# Patient Record
Sex: FEMALE | Race: WHITE | NOT HISPANIC OR LATINO | Employment: OTHER | URBAN - METROPOLITAN AREA
[De-identification: names, ages, dates, MRNs, and addresses within clinical notes are randomized per-mention and may not be internally consistent; named-entity substitution may affect disease eponyms.]

---

## 2018-02-13 ENCOUNTER — OFFICE VISIT CONVERTED (OUTPATIENT)
Dept: CARDIOLOGY | Facility: CLINIC | Age: 63
End: 2018-02-13
Attending: SPECIALIST

## 2018-07-27 ENCOUNTER — CONVERSION ENCOUNTER (OUTPATIENT)
Dept: MAMMOGRAPHY | Facility: HOSPITAL | Age: 63
End: 2018-07-27

## 2018-09-18 ENCOUNTER — OFFICE VISIT CONVERTED (OUTPATIENT)
Dept: CARDIOLOGY | Facility: CLINIC | Age: 63
End: 2018-09-18
Attending: SPECIALIST

## 2019-03-07 ENCOUNTER — HOSPITAL ENCOUNTER (OUTPATIENT)
Dept: OTHER | Facility: HOSPITAL | Age: 64
Discharge: HOME | End: 2019-03-07
Attending: INTERNAL MEDICINE

## 2019-03-07 LAB
ALBUMIN SERPL-MCNC: 4.5 G/DL (ref 3.5–5)
ALBUMIN/GLOB SERPL: 1.4 {RATIO} (ref 1.4–2.6)
ALP SERPL-CCNC: 71 U/L (ref 43–160)
ALT SERPL-CCNC: 56 U/L (ref 10–40)
ANION GAP SERPL CALC-SCNC: 20 MMOL/L (ref 8–19)
AST SERPL-CCNC: 37 U/L (ref 15–50)
BILIRUB SERPL-MCNC: 0.58 MG/DL (ref 0.2–1.3)
BUN SERPL-MCNC: 16 MG/DL (ref 5–25)
BUN/CREAT SERPL: 22 {RATIO} (ref 6–20)
CALCIUM SERPL-MCNC: 9.6 MG/DL (ref 8.7–10.4)
CHLORIDE SERPL-SCNC: 99 MMOL/L (ref 99–111)
CHOLEST SERPL-MCNC: 165 MG/DL (ref 107–200)
CHOLEST/HDLC SERPL: 2.8 {RATIO} (ref 3–6)
CONV CO2: 25 MMOL/L (ref 22–32)
CONV TOTAL PROTEIN: 7.7 G/DL (ref 6.3–8.2)
CREAT UR-MCNC: 0.73 MG/DL (ref 0.5–0.9)
EST. AVERAGE GLUCOSE BLD GHB EST-MCNC: 160 MG/DL
GFR SERPLBLD BASED ON 1.73 SQ M-ARVRAT: >60 ML/MIN/{1.73_M2}
GLOBULIN UR ELPH-MCNC: 3.2 G/DL (ref 2–3.5)
GLUCOSE SERPL-MCNC: 196 MG/DL (ref 65–99)
HBA1C MFR BLD: 7.2 % (ref 3.5–5.7)
HDLC SERPL-MCNC: 59 MG/DL (ref 40–60)
LDLC SERPL CALC-MCNC: 53 MG/DL (ref 70–100)
OSMOLALITY SERPL CALC.SUM OF ELEC: 297 MOSM/KG (ref 273–304)
POTASSIUM SERPL-SCNC: 4.2 MMOL/L (ref 3.5–5.3)
SODIUM SERPL-SCNC: 140 MMOL/L (ref 135–147)
TRIGL SERPL-MCNC: 263 MG/DL (ref 40–150)
VLDLC SERPL-MCNC: 53 MG/DL (ref 5–37)

## 2019-03-18 ENCOUNTER — CONVERSION ENCOUNTER (OUTPATIENT)
Dept: CARDIOLOGY | Facility: CLINIC | Age: 64
End: 2019-03-18

## 2019-03-18 ENCOUNTER — CONVERSION ENCOUNTER (OUTPATIENT)
Dept: CARDIOLOGY | Facility: CLINIC | Age: 64
End: 2019-03-18
Attending: SPECIALIST

## 2019-06-03 ENCOUNTER — HOSPITAL ENCOUNTER (OUTPATIENT)
Dept: OTHER | Facility: HOSPITAL | Age: 64
Discharge: HOME | End: 2019-06-03

## 2019-06-03 LAB
ALBUMIN SERPL-MCNC: 4.4 G/DL (ref 3.5–5)
ALBUMIN/GLOB SERPL: 1.5 {RATIO} (ref 1.4–2.6)
ALP SERPL-CCNC: 63 U/L (ref 43–160)
ALT SERPL-CCNC: 44 U/L (ref 10–40)
ANION GAP SERPL CALC-SCNC: 18 MMOL/L (ref 8–19)
APPEARANCE UR: CLEAR
AST SERPL-CCNC: 34 U/L (ref 15–50)
BASOPHILS # BLD AUTO: 0.06 10*3/UL (ref 0–0.2)
BASOPHILS NFR BLD AUTO: 0.8 % (ref 0–3)
BILIRUB SERPL-MCNC: 0.75 MG/DL (ref 0.2–1.3)
BILIRUB UR QL: NEGATIVE
BUN SERPL-MCNC: 15 MG/DL (ref 5–25)
BUN/CREAT SERPL: 17 {RATIO} (ref 6–20)
CALCIUM SERPL-MCNC: 9.5 MG/DL (ref 8.7–10.4)
CHLORIDE SERPL-SCNC: 100 MMOL/L (ref 99–111)
CHOLEST SERPL-MCNC: 151 MG/DL (ref 107–200)
CHOLEST/HDLC SERPL: 3.4 {RATIO} (ref 3–6)
COLOR UR: YELLOW
CONV ABS IMM GRAN: 0.03 10*3/UL (ref 0–0.2)
CONV BACTERIA: NEGATIVE
CONV CO2: 25 MMOL/L (ref 22–32)
CONV COLLECTION SOURCE (UA): ABNORMAL
CONV CREATININE URINE, RANDOM: 101.9 MG/DL (ref 10–300)
CONV IMMATURE GRAN: 0.4 % (ref 0–1.8)
CONV MICROALBUM.,U,RANDOM: <12 MG/L (ref 0–20)
CONV TOTAL PROTEIN: 7.4 G/DL (ref 6.3–8.2)
CONV UROBILINOGEN IN URINE BY AUTOMATED TEST STRIP: 1 {EHRLICHU}/DL (ref 0.1–1)
CREAT UR-MCNC: 0.88 MG/DL (ref 0.5–0.9)
DEPRECATED RDW RBC AUTO: 41.2 FL (ref 36.4–46.3)
EOSINOPHIL # BLD AUTO: 0.36 10*3/UL (ref 0–0.7)
EOSINOPHIL # BLD AUTO: 4.6 % (ref 0–7)
ERYTHROCYTE [DISTWIDTH] IN BLOOD BY AUTOMATED COUNT: 12.5 % (ref 11.7–14.4)
EST. AVERAGE GLUCOSE BLD GHB EST-MCNC: 143 MG/DL
GFR SERPLBLD BASED ON 1.73 SQ M-ARVRAT: >60 ML/MIN/{1.73_M2}
GLOBULIN UR ELPH-MCNC: 3 G/DL (ref 2–3.5)
GLUCOSE SERPL-MCNC: 164 MG/DL (ref 65–99)
GLUCOSE UR QL: NEGATIVE MG/DL
HBA1C MFR BLD: 13.5 G/DL (ref 12–16)
HBA1C MFR BLD: 6.6 % (ref 3.5–5.7)
HCT VFR BLD AUTO: 40.6 % (ref 37–47)
HDLC SERPL-MCNC: 44 MG/DL (ref 40–60)
HGB UR QL STRIP: NEGATIVE
KETONES UR QL STRIP: NEGATIVE MG/DL
LDLC SERPL CALC-MCNC: 66 MG/DL (ref 70–100)
LEUKOCYTE ESTERASE UR QL STRIP: ABNORMAL
LYMPHOCYTES # BLD AUTO: 1.99 10*3/UL (ref 1–5)
MCH RBC QN AUTO: 30.1 PG (ref 27–31)
MCHC RBC AUTO-ENTMCNC: 33.3 G/DL (ref 33–37)
MCV RBC AUTO: 90.4 FL (ref 81–99)
MICROALBUMIN/CREAT UR: 11.8 MG/G{CRE} (ref 0–35)
MONOCYTES # BLD AUTO: 0.44 10*3/UL (ref 0.2–1.2)
MONOCYTES NFR BLD AUTO: 5.7 % (ref 3–10)
NEUTROPHILS # BLD AUTO: 4.87 10*3/UL (ref 2–8)
NEUTROPHILS NFR BLD AUTO: 62.8 % (ref 30–85)
NITRITE UR QL STRIP: NEGATIVE
NRBC CBCN: 0 % (ref 0–0.7)
OSMOLALITY SERPL CALC.SUM OF ELEC: 292 MOSM/KG (ref 273–304)
PH UR STRIP.AUTO: 6 [PH] (ref 5–8)
PLATELET # BLD AUTO: 252 10*3/UL (ref 130–400)
PMV BLD AUTO: 10.3 FL (ref 9.4–12.3)
POTASSIUM SERPL-SCNC: 4.2 MMOL/L (ref 3.5–5.3)
PROT UR QL: NEGATIVE MG/DL
RBC # BLD AUTO: 4.49 10*6/UL (ref 4.2–5.4)
RBC #/AREA URNS HPF: ABNORMAL /[HPF]
SODIUM SERPL-SCNC: 139 MMOL/L (ref 135–147)
SP GR UR: 1.01 (ref 1–1.03)
T4 FREE SERPL-MCNC: 1.2 NG/DL (ref 0.9–1.8)
TRIGL SERPL-MCNC: 207 MG/DL (ref 40–150)
TSH SERPL-ACNC: 4.16 M[IU]/L (ref 0.27–4.2)
VARIANT LYMPHS NFR BLD MANUAL: 25.7 % (ref 20–45)
VLDLC SERPL-MCNC: 41 MG/DL (ref 5–37)
WBC # BLD AUTO: 7.75 10*3/UL (ref 4.8–10.8)
WBC #/AREA URNS HPF: ABNORMAL /[HPF]

## 2019-08-28 ENCOUNTER — HOSPITAL ENCOUNTER (OUTPATIENT)
Dept: OTHER | Facility: HOSPITAL | Age: 64
Discharge: HOME | End: 2019-08-28

## 2019-08-28 LAB
25(OH)D3 SERPL-MCNC: 24.3 NG/ML (ref 30–100)
ALBUMIN SERPL-MCNC: 4.6 G/DL (ref 3.5–5)
ALBUMIN/GLOB SERPL: 1.5 {RATIO} (ref 1.4–2.6)
ALP SERPL-CCNC: 61 U/L (ref 43–160)
ALT SERPL-CCNC: 48 U/L (ref 10–40)
ANION GAP SERPL CALC-SCNC: 19 MMOL/L (ref 8–19)
APPEARANCE UR: CLEAR
AST SERPL-CCNC: 35 U/L (ref 15–50)
BASOPHILS # BLD AUTO: 0.09 10*3/UL (ref 0–0.2)
BASOPHILS NFR BLD AUTO: 1.2 % (ref 0–3)
BILIRUB SERPL-MCNC: 0.68 MG/DL (ref 0.2–1.3)
BILIRUB UR QL: NEGATIVE
BUN SERPL-MCNC: 16 MG/DL (ref 5–25)
BUN/CREAT SERPL: 19 {RATIO} (ref 6–20)
CALCIUM SERPL-MCNC: 9.6 MG/DL (ref 8.7–10.4)
CHLORIDE SERPL-SCNC: 101 MMOL/L (ref 99–111)
CHOLEST SERPL-MCNC: 163 MG/DL (ref 107–200)
CHOLEST/HDLC SERPL: 3.1 {RATIO} (ref 3–6)
COLOR UR: YELLOW
CONV ABS IMM GRAN: 0.02 10*3/UL (ref 0–0.2)
CONV BACTERIA: NEGATIVE
CONV CO2: 24 MMOL/L (ref 22–32)
CONV COLLECTION SOURCE (UA): ABNORMAL
CONV IMMATURE GRAN: 0.3 % (ref 0–1.8)
CONV TOTAL PROTEIN: 7.6 G/DL (ref 6.3–8.2)
CONV UROBILINOGEN IN URINE BY AUTOMATED TEST STRIP: 0.2 {EHRLICHU}/DL (ref 0.1–1)
CREAT UR-MCNC: 0.85 MG/DL (ref 0.5–0.9)
DEPRECATED RDW RBC AUTO: 41.5 FL (ref 36.4–46.3)
EOSINOPHIL # BLD AUTO: 0.32 10*3/UL (ref 0–0.7)
EOSINOPHIL # BLD AUTO: 4.3 % (ref 0–7)
ERYTHROCYTE [DISTWIDTH] IN BLOOD BY AUTOMATED COUNT: 12.7 % (ref 11.7–14.4)
EST. AVERAGE GLUCOSE BLD GHB EST-MCNC: 148 MG/DL
GFR SERPLBLD BASED ON 1.73 SQ M-ARVRAT: >60 ML/MIN/{1.73_M2}
GLOBULIN UR ELPH-MCNC: 3 G/DL (ref 2–3.5)
GLUCOSE SERPL-MCNC: 156 MG/DL (ref 65–99)
GLUCOSE UR QL: NEGATIVE MG/DL
HBA1C MFR BLD: 6.8 % (ref 3.5–5.7)
HCT VFR BLD AUTO: 40.8 % (ref 37–47)
HDLC SERPL-MCNC: 53 MG/DL (ref 40–60)
HGB BLD-MCNC: 13.8 G/DL (ref 12–16)
HGB UR QL STRIP: NEGATIVE
KETONES UR QL STRIP: NEGATIVE MG/DL
LDLC SERPL CALC-MCNC: 64 MG/DL (ref 70–100)
LEUKOCYTE ESTERASE UR QL STRIP: ABNORMAL
LYMPHOCYTES # BLD AUTO: 2.12 10*3/UL (ref 1–5)
LYMPHOCYTES NFR BLD AUTO: 28.2 % (ref 20–45)
MCH RBC QN AUTO: 30.2 PG (ref 27–31)
MCHC RBC AUTO-ENTMCNC: 33.8 G/DL (ref 33–37)
MCV RBC AUTO: 89.3 FL (ref 81–99)
MONOCYTES # BLD AUTO: 0.41 10*3/UL (ref 0.2–1.2)
MONOCYTES NFR BLD AUTO: 5.5 % (ref 3–10)
NEUTROPHILS # BLD AUTO: 4.55 10*3/UL (ref 2–8)
NEUTROPHILS NFR BLD AUTO: 60.5 % (ref 30–85)
NITRITE UR QL STRIP: NEGATIVE
NRBC CBCN: 0 % (ref 0–0.7)
OSMOLALITY SERPL CALC.SUM OF ELEC: 294 MOSM/KG (ref 273–304)
PH UR STRIP.AUTO: 6.5 [PH] (ref 5–8)
PLATELET # BLD AUTO: 245 10*3/UL (ref 130–400)
PMV BLD AUTO: 9.9 FL (ref 9.4–12.3)
POTASSIUM SERPL-SCNC: 4.4 MMOL/L (ref 3.5–5.3)
PROT UR QL: NEGATIVE MG/DL
RBC # BLD AUTO: 4.57 10*6/UL (ref 4.2–5.4)
RBC #/AREA URNS HPF: ABNORMAL /[HPF]
SODIUM SERPL-SCNC: 140 MMOL/L (ref 135–147)
SP GR UR: 1.01 (ref 1–1.03)
SQUAMOUS SPT QL MICRO: ABNORMAL /[HPF]
TRIGL SERPL-MCNC: 229 MG/DL (ref 40–150)
VLDLC SERPL-MCNC: 46 MG/DL (ref 5–37)
WBC # BLD AUTO: 7.51 10*3/UL (ref 4.8–10.8)
WBC #/AREA URNS HPF: ABNORMAL /[HPF]

## 2019-08-30 LAB — BACTERIA UR CULT: NORMAL

## 2019-09-24 ENCOUNTER — CONVERSION ENCOUNTER (OUTPATIENT)
Dept: CARDIOLOGY | Facility: CLINIC | Age: 64
End: 2019-09-24
Attending: SPECIALIST

## 2020-06-23 ENCOUNTER — CONVERSION ENCOUNTER (OUTPATIENT)
Dept: CARDIOLOGY | Facility: CLINIC | Age: 65
End: 2020-06-23

## 2020-06-23 ENCOUNTER — OFFICE VISIT CONVERTED (OUTPATIENT)
Dept: CARDIOLOGY | Facility: CLINIC | Age: 65
End: 2020-06-23
Attending: SPECIALIST

## 2021-01-15 ENCOUNTER — CONVERSION ENCOUNTER (OUTPATIENT)
Dept: CARDIOLOGY | Facility: CLINIC | Age: 66
End: 2021-01-15
Attending: SPECIALIST

## 2021-01-15 ENCOUNTER — CONVERSION ENCOUNTER (OUTPATIENT)
Dept: CARDIOLOGY | Facility: CLINIC | Age: 66
End: 2021-01-15

## 2021-05-10 NOTE — PROCEDURES
"   Procedure Note      Patient Name: Geeta Blair   Patient ID: [id number]   Sex: Female   YOB: 1955    Primary Care Provider: Shannan HEREDIA   Referring Provider: Shannan HEREDIA    Visit Date: January 15, 2021    Provider: Wil Montano MD   Location: [facility]   Location Address: [address] [place] [address], [address]   [place]  [id number]   Location Phone: [phone]          FINAL REPORT   TRANSTHORACIC ECHOCARDIOGRAM REPORT    Diagnosis: Cardiomyopathy   Height: 5'5\" Weight: 183 B/P: 162/102 BSA: 1.9   Tech: [email or web address]   MEASUREMENTS:  RVID (Diastole) : RVID. (NORMAL: 0.7 to 2.4 cm max)   LVID (Systole): 4.0 cm (Diastole): 5.8 cm . (NORMAL: 3.7 - 5.4 cm)   Posterior Wall Thickness (Diastole): 0.9 cm. (NORMAL: 0.8 - 1.1 cm)   Septal Thickness (Diastole): 0.9 cm. (NORMAL: 0.7 - 1.2 cm)   LAID (Systole): 3.6 cm. (NORMAL: 1.9 - 3.8 cm)   Aortic Root Diameter (Diastole): 2.9 cm. (NORMAL: 2.0 - 3.7 cm)   DOPPLER:  E/A ratio 0.5 (NORMAL 0.8-2.0)   DT: 194 msec (NORMAL 140-240 msec.)   IVRT 92 m/sec (NORMAL  m/sec.)   E/E': 11 (NORMAL <8 avg.)   COMMENTS:  The patient underwent 2-D, M-Mode, and Doppler examination, including pulse-wave, continuous-wave, and color-flow analysis; the study is technically adequate.   FINDINGS:  AORTIC VALVE: Normal. Tricuspid in appearance with normal central closure.   MITRAL VALVE: Normal. Bicuspid in appearance.   TRICUSPID VALVE: Normal.   PULMONIC VALVE: Not well visualized.   LEFT ATRIUM: Normal. No intracavitary masses or clots seen. LA volume index is 28 mL/m2.   AORTIC ROOT: Normal in size with adequate motion.   LEFT VENTRICLE: Normal left ventricular systolic function. Ejection fraction 45-50%. Mild diffuse hypokinesis.   RIGHT ATRIUM: Normal.   RIGHT VENTRICLE: Normal size and function.   PERICARDIUM: Unremarkable. No evidence of effusion.   INFERIOR VENA CAVA: Diameter is 1.3 cm.   DOPPLER: Doppler examination of the aortic, mitral, " tricuspid, and pulmonary valves was performed. Normal pulmonary artery systolic pressure by Doppler. There was mild mitral regurgitation.   Faxed: 01/18/2021      CONCLUSION:  1.  Mildly reduced left ventricular systolic function.   2.  Mild mitral regurgitation.       Wil Montano MD  ONOFRE/pap                 Electronically Signed by: Mariely Mckeon-, Other -Author on January 18, 2021 01:42:22 PM  Electronically Co-signed by: Wil Montano MD -Reviewer on January 20, 2021 10:46:57 AM  Electronically Co-signed by: Alyssa Motta-, Other -Reviewer on February 3, 2021 06:23:51 PM

## 2021-05-13 NOTE — PROGRESS NOTES
"   Progress Note      Patient Name: Geeta Blair   Patient ID: [id number]   Sex: Female   YOB: 1955    Primary Care Provider: Shannan HEREDIA   Referring Provider: Shannan HEREDIA    Visit Date: June 23, 2020    Provider: Wil Montano MD   Location: [facility]   Location Address: [address] [place] [address], [address]   [place]  [id number]   Location Phone: [phone]          Chief Complaint  · Hypertension.  · Cardiomyopathy.      History Of Present Illness  Geeta Blair is a 64 year old /White female with a history of cardiomyopathy and hypertension. Denies any chest pain or shortness of breath. No ICD shocks.   CURRENT MEDICATIONS: Lisinopril 5 mg daily; Klor-Con M20 20 mEq daily; metformin  mg b.i.d.; atorvastatin 20 mg q. p.m.; furosemide 40 mg daily; carvedilol 6.25 mg 1.5 tablets b.i.d.; glimepiride 2 mg daily; aspirin 81 mg q. p.m.; Omega-3 2 capsules b.i.d.; NitroPatch 0.4 mg/hour q. a.m.   PAST MEDICAL HISTORY: Cardiomyopathy; Congestive heart failure; Diabetes mellitus; Hyperlipidemia; Hypertension; Presence of ICD.   FAMILY HISTORY: Positive for diabetes mellitus, hypertension, and heart disease.   PSYCHOSOCIAL HISTORY: Walks her dog 4-5x daily. Denies tobacco use. Rarely consumes alcohol. Denies mood changes or depression.       Review of Systems  · Cardiovascular  o Denies  o : palpitations (fast, fluttering, or skipping beats), swelling (feet, ankles, hands), shortness of breath while walking or lying flat, chest pain or angina pectoris   · Respiratory  o Denies  o : chronic or frequent cough, asthma or wheezing      Vitals  Date Time BP Position Site L\R Cuff Size HR RR TEMP (F) WT  HT  BMI kg/m2 BSA m2 O2 Sat HC       06/23/2020 11:00 /102 Sitting    88 - R   183lbs 0oz 5'  5\" 30.45 1.95     06/23/2020 11:00 /88 Sitting                     Physical Examination  · Constitutional  o Appearance  o : Awake, alert, " cooperative, pleasant.  · Respiratory  o Inspection of Chest  o : No chest wall deformities, moving equal.  o Auscultation of Lungs  o : Good air entry with vesicular breath sounds.  · Cardiovascular  o Heart  o :   § Auscultation of Heart  § : S1 and S2 regular. No S3. No S4. No murmurs.  o Peripheral Vascular System  o :   § Extremities  § : Peripheral pulses were well felt. No edema. No cyanosis.  · Gastrointestinal  o Abdominal Examination  o : No masses or organomegaly noted.          Assessment     ASSESSMENT & PLAN:    1.  Nonischemic cardiomyopathy with presence of ICD, stable.  ICD was checked per the  and        is functioning normally.  Continue current dose of carvedilol.  Continue current dose of lisinopril.  2.  Essential hypertension.   Monitor blood pressure regularly.  Continue current dose of lisinopril and        carvedilol.    3.  Chronic systolic heart failure, class II, stable.  Continue current dose of Lasix.  4.  See me back in 6 months.             Electronically Signed by: Alyssa Motta-, Other -Author on June 25, 2020 11:33:06 AM  Electronically Co-signed by: Wil Montano MD -Reviewer on June 30, 2020 08:30:50 AM

## 2021-05-14 VITALS
HEART RATE: 82 BPM | WEIGHT: 183 LBS | DIASTOLIC BLOOD PRESSURE: 86 MMHG | BODY MASS INDEX: 30.49 KG/M2 | SYSTOLIC BLOOD PRESSURE: 162 MMHG | HEIGHT: 65 IN

## 2021-05-14 NOTE — PROGRESS NOTES
"   Progress Note      Patient Name: Geeta Blair   Patient ID: [id number]   Sex: Female   YOB: 1955    Primary Care Provider: Shannan HEREDIA   Referring Provider: Shannan HEREDIA    Visit Date: January 15, 2021    Provider: Wil Montano MD   Location: [facility]   Location Address: [address] [place] [address], [address]   [place], [place]  [id number]   Location Phone: [phone]          Chief Complaint  · Cardiomyopathy.  · Hypertension.  · Presence of ICD.       History Of Present Illness  Geeta Blair is a 65 year old /White female with a history of cardiomyopathy and hypertension. No chest pain. No ICD shocks.   CURRENT MEDICATIONS: Lisinopril 20 mg daily; metformin 500 mg b.i.d.; carvedilol 6.25 mg 1.5 tablets b.i.d.; glimepiride 2 mg b.i.d.; atorvastatin 20 mg q. h.s.; aspirin 81 mg daily; furosemide 40 mg daily; Klor-Con M20 20 mEq daily; Omega-3 2 capsules b.i.d.; NitroPatch 0.4 mg/hour daily; multivitamin daily.   PAST MEDICAL HISTORY: Cardiomyopathy; Congestive heart failure; Diabetes mellitus; Hyperlipidemia; Hypertension; Presence of ICD.   PSYCHOSOCIAL HISTORY: Denies alcohol or tobacco use.      ALLERGIES:  PENICILLIN.       Review of Systems  · Cardiovascular  o Denies  o : palpitations (fast, fluttering, or skipping beats), swelling (feet, ankles, hands), shortness of breath while walking or lying flat, chest pain or angina pectoris   · Respiratory  o Denies  o : chronic or frequent cough      Vitals  Date Time BP Position Site L\R Cuff Size HR RR TEMP (F) WT  HT  BMI kg/m2 BSA m2 O2 Sat FR L/min FiO2 HC       01/15/2021 06:18 /86 Sitting    82 - R   183lbs 0oz 5'  5\" 30.45 1.95       01/15/2021 06:18 /86 Sitting    78 - R                   Physical Examination  · Constitutional  o Appearance  o : Awake, alert, cooperative, pleasant.  · Respiratory  o Inspection of Chest  o : No chest wall deformities, moving equal.  o Auscultation of " Lungs  o : Good air entry with vesicular breath sounds.  · Cardiovascular  o Heart  o :   § Auscultation of Heart  § : S1 and S2 regular. No S3. No S4.   o Peripheral Vascular System  o :   § Extremities  § : Peripheral pulses were well felt. No edema. No cyanosis.  · Gastrointestinal  o Abdominal Examination  o : No masses or organomegaly noted.          Assessment     ASSESSMENT & PLAN:     1.  Nonischemic cardiomyopathy with presence of ICD, stable.  ICD checked per the , functioning        normally.  Echocardiogram done shows an ejection fraction of 49%.  Discussed this with the patient.  2.  Essential hypertension.  Continue guideline-directed medical therapy with carvedilol and lisinopril.    3.  Chronic systolic heart failure, class II, stable.  Continue Lasix.  4.  See me back in 6 months.             Electronically Signed by: Alyssa Motta-, Other -Author on February 3, 2021 06:26:51 PM  Electronically Co-signed by: Wil Montano MD -Reviewer on February 4, 2021 09:40:59 AM

## 2021-05-15 VITALS
HEIGHT: 65 IN | WEIGHT: 186 LBS | BODY MASS INDEX: 30.99 KG/M2 | HEART RATE: 90 BPM | DIASTOLIC BLOOD PRESSURE: 90 MMHG | SYSTOLIC BLOOD PRESSURE: 150 MMHG

## 2021-05-15 VITALS
WEIGHT: 181 LBS | HEART RATE: 94 BPM | SYSTOLIC BLOOD PRESSURE: 153 MMHG | BODY MASS INDEX: 30.16 KG/M2 | DIASTOLIC BLOOD PRESSURE: 85 MMHG | HEIGHT: 65 IN

## 2021-05-15 VITALS
BODY MASS INDEX: 30.49 KG/M2 | HEART RATE: 88 BPM | SYSTOLIC BLOOD PRESSURE: 162 MMHG | DIASTOLIC BLOOD PRESSURE: 102 MMHG | WEIGHT: 183 LBS | HEIGHT: 65 IN

## 2021-05-16 VITALS
HEIGHT: 65 IN | WEIGHT: 182 LBS | BODY MASS INDEX: 30.32 KG/M2 | SYSTOLIC BLOOD PRESSURE: 138 MMHG | DIASTOLIC BLOOD PRESSURE: 80 MMHG | HEART RATE: 78 BPM

## 2021-05-16 VITALS
BODY MASS INDEX: 30.66 KG/M2 | HEIGHT: 65 IN | DIASTOLIC BLOOD PRESSURE: 94 MMHG | HEART RATE: 76 BPM | WEIGHT: 184 LBS | SYSTOLIC BLOOD PRESSURE: 146 MMHG

## 2021-09-01 PROBLEM — I50.22 SYSTOLIC CHF, CHRONIC: Status: ACTIVE | Noted: 2021-09-01

## 2021-09-01 PROBLEM — I10 HYPERTENSION, ESSENTIAL: Status: ACTIVE | Noted: 2021-09-01

## 2021-09-01 PROBLEM — E78.2 HYPERLIPEMIA, MIXED: Status: ACTIVE | Noted: 2021-09-01

## 2021-09-01 PROBLEM — I42.0 NONISCHEMIC DILATED CARDIOMYOPATHY: Status: ACTIVE | Noted: 2021-09-01

## 2021-09-01 PROBLEM — Z95.810 PRESENCE OF IMPLANTABLE CARDIOVERTER-DEFIBRILLATOR (ICD): Status: ACTIVE | Noted: 2021-09-01

## 2021-09-01 NOTE — PROGRESS NOTES
[facility]  Cardiology progress Note    Patient Name: Geeta Blair  : 1955    CHIEF COMPLAINT  Hypertension.  Cardiomyopathy.        Subjective   Subjective     HISTORY OF PRESENT ILLNESS  Geeta Blair is a 66 year old /White female with a history of cardiomyopathy and hypertension. Denies any chest pain or shortness of breath. No ICD shocks.       Review of Systems:   Constitutional no fever,  no weight loss   Skin no rash   Otolaryngeal no difficulty swallowing   Cardiovascular See HPI   Pulmonary no cough, no sputum production   Gastrointestinal no constipation, no diarrhea   Genitourinary no dysuria, no hematuria   Hematologic no easy bruisability, no abnormal bleeding   Musculoskeletal no muscle pain   Neurologic no dizziness, no falls         Personal History     Social History:  reports that she has never smoked. She has never used smokeless tobacco. She reports previous alcohol use. She reports that she does not use drugs.    Home Medications:  Current Outpatient Medications on File Prior to Visit   Medication Sig   • aspirin (aspirin) 81 MG EC tablet Take 81 mg by mouth Daily.   • atorvastatin (LIPITOR) 20 MG tablet Take 20 mg by mouth Daily.   • carvedilol (COREG) 12.5 MG tablet Take 12.5 mg by mouth 2 (two) times a day.   • furosemide (LASIX) 40 MG tablet Take 40 mg by mouth Daily.   • glimepiride (AMARYL) 2 MG tablet Take 2 mg by mouth 2 (two) times a day.   • lisinopril (PRINIVIL,ZESTRIL) 20 MG tablet Take 20 mg by mouth Daily.   • metFORMIN ER (GLUCOPHAGE-XR) 500 MG 24 hr tablet    • multivitamin with minerals (MULTIVITAMIN ADULT PO) Daily.   • nitroglycerin (NITRODUR) 0.4 MG/HR patch 0.4 patches Daily.   • omega-3 acid ethyl esters (LOVAZA) 1 g capsule Take 2 g by mouth 2 (two) times a day.   • potassium chloride (KLOR-CON) 20 MEQ CR tablet Take 20 mEq by mouth Daily.     No current facility-administered medications on file prior to visit.     Allergies:  Allergies    Allergen Reactions   • Penicillins Itching       Objective    Objective       Vitals:   Heart Rate:  [81] 81  BP: (156)/(88) 156/88  Body mass index is 29.95 kg/m².     Physical Exam:   Constitutional: Awake, alert, No acute distress    Eyes: PERRLA, sclerae anicteric, no conjunctival injection   HENT: NCAT, mucous membranes moist   Neck: Supple, no thyromegaly, no lymphadenopathy, trachea midline   Respiratory: Clear to auscultation bilaterally, nonlabored respirations    Cardiovascular: RRR, no murmurs or rubs. Palpable pedal pulses bilaterally   Gastrointestinal: Positive bowel sounds, soft, nontender, nondistended   Musculoskeletal: No bilateral ankle edema, no cyanosis to extremities   Psychiatric: Appropriate affect, cooperative   Neurologic: Oriented x 3, strength symmetric in all extremities, Cranial Nerves grossly intact to confrontation, speech clear   Skin: No rashes.    Result Review    Result Review:  I have personally reviewed the available results from  [x]  Laboratory  [x]  EKG  [x]  Cardiology  [x]  Medications  [x]  Old records  []  Other:   Procedures  No results found for: CHOL  Lab Results   Component Value Date    TRIG 229 (H) 08/28/2019    TRIG 207 (H) 06/03/2019    TRIG 263 (H) 03/07/2019     Lab Results   Component Value Date    HDL 53 08/28/2019    HDL 44 06/03/2019    HDL 59 03/07/2019     Lab Results   Component Value Date    LDL 64 (L) 08/28/2019    LDL 66 (L) 06/03/2019    LDL 53 (L) 03/07/2019     Lab Results   Component Value Date    VLDL 46 (H) 08/28/2019    VLDL 41 (H) 06/03/2019    VLDL 53 (H) 03/07/2019         Impression/Plan:  1.  Nonischemic cardiomyopathy with presence of ICD, stable.  ICD was checked with  and  is functioning normally.  Continue current dose of carvedilol.  Continue current dose of lisinopril.  2.  Essential hypertension.   Monitor blood pressure regularly.  Continue current dose of lisinopril and carvedilol.  Her lisinopril and carvedilol has  been increased recently.   3.  Chronic systolic heart failure, class II, stable.  Continue current dose of Lasix.  4.  Hyperlipidemia: Continue Lipitor.  Low-fat diet advised.  Lipid profile managed by PMD.           Wil Montano MD   09/02/21   13:44 EDT

## 2021-09-02 ENCOUNTER — CLINICAL SUPPORT NO REQUIREMENTS (OUTPATIENT)
Dept: CARDIOLOGY | Facility: CLINIC | Age: 66
End: 2021-09-02

## 2021-09-02 ENCOUNTER — OFFICE VISIT (OUTPATIENT)
Dept: CARDIOLOGY | Facility: CLINIC | Age: 66
End: 2021-09-02

## 2021-09-02 VITALS
HEART RATE: 81 BPM | HEIGHT: 65 IN | SYSTOLIC BLOOD PRESSURE: 156 MMHG | BODY MASS INDEX: 29.99 KG/M2 | DIASTOLIC BLOOD PRESSURE: 88 MMHG | WEIGHT: 180 LBS

## 2021-09-02 DIAGNOSIS — Z95.810 PRESENCE OF IMPLANTABLE CARDIOVERTER-DEFIBRILLATOR (ICD): ICD-10-CM

## 2021-09-02 DIAGNOSIS — I42.8 OTHER CARDIOMYOPATHY (HCC): Primary | ICD-10-CM

## 2021-09-02 DIAGNOSIS — I42.0 NONISCHEMIC DILATED CARDIOMYOPATHY (HCC): Primary | ICD-10-CM

## 2021-09-02 DIAGNOSIS — I50.22 SYSTOLIC CHF, CHRONIC (HCC): ICD-10-CM

## 2021-09-02 DIAGNOSIS — I10 HYPERTENSION, ESSENTIAL: ICD-10-CM

## 2021-09-02 DIAGNOSIS — E78.2 HYPERLIPEMIA, MIXED: ICD-10-CM

## 2021-09-02 DIAGNOSIS — I42.0 NONISCHEMIC DILATED CARDIOMYOPATHY (HCC): ICD-10-CM

## 2021-09-02 PROCEDURE — 93282 PRGRMG EVAL IMPLANTABLE DFB: CPT | Performed by: SPECIALIST

## 2021-09-02 PROCEDURE — 99214 OFFICE O/P EST MOD 30 MIN: CPT | Performed by: SPECIALIST

## 2021-09-02 RX ORDER — LISINOPRIL 20 MG/1
20 TABLET ORAL DAILY
COMMUNITY
Start: 2021-07-06

## 2021-09-02 RX ORDER — OMEGA-3-ACID ETHYL ESTERS 1 G/1
2 CAPSULE, LIQUID FILLED ORAL 2 TIMES DAILY
COMMUNITY
Start: 2021-07-19

## 2021-09-02 RX ORDER — NITROGLYCERIN 80 MG/1
0.4 PATCH TRANSDERMAL DAILY
COMMUNITY
Start: 2021-07-04

## 2021-09-02 RX ORDER — GLIMEPIRIDE 2 MG/1
2 TABLET ORAL 2 TIMES DAILY
COMMUNITY
Start: 2021-06-10

## 2021-09-02 RX ORDER — METFORMIN HYDROCHLORIDE 500 MG/1
TABLET, EXTENDED RELEASE ORAL
COMMUNITY
Start: 2021-08-17

## 2021-09-02 RX ORDER — ASPIRIN 81 MG/1
81 TABLET ORAL DAILY
COMMUNITY

## 2021-09-02 RX ORDER — MULTIPLE VITAMINS W/ MINERALS TAB 9MG-400MCG
TAB ORAL DAILY
COMMUNITY

## 2021-09-02 RX ORDER — CARVEDILOL 12.5 MG/1
12.5 TABLET ORAL 2 TIMES DAILY
COMMUNITY
Start: 2021-07-01

## 2021-09-02 RX ORDER — POTASSIUM CHLORIDE 20 MEQ/1
20 TABLET, EXTENDED RELEASE ORAL DAILY
COMMUNITY

## 2021-09-02 RX ORDER — FUROSEMIDE 40 MG/1
40 TABLET ORAL DAILY
COMMUNITY

## 2021-09-02 RX ORDER — ATORVASTATIN CALCIUM 10 MG/1
10 TABLET, FILM COATED ORAL DAILY
COMMUNITY

## 2021-09-02 NOTE — PROGRESS NOTES
Normal VVI ICD device interrogation.  Normal evaluation of device function and lead measurements.  No optimization was needed of parameters or maximization of device longevity.  Patient is on automated Home Remote Monitoring.

## 2022-04-11 NOTE — PROGRESS NOTES
[facility]  Cardiology progress Note    Patient Name: Geeta Blair  : 1955    CHIEF COMPLAINT  Cardiomyopathy      Subjective   Subjective     HISTORY OF PRESENT ILLNESS    Geeta Blair is a 66 y.o. female with history of nonischemic cardiomyopathy and CHF.  No chest pain.  No shortness of breath.    Review of Systems:   Constitutional no fever,  no weight loss   Skin no rash   Otolaryngeal no difficulty swallowing   Cardiovascular See HPI   Pulmonary no cough, no sputum production   Gastrointestinal no constipation, no diarrhea   Genitourinary no dysuria, no hematuria   Hematologic no easy bruisability, no abnormal bleeding   Musculoskeletal no muscle pain   Neurologic no dizziness, no falls         Personal History     Social History:  reports that she has never smoked. She has never used smokeless tobacco. She reports previous alcohol use. She reports that she does not use drugs.    Home Medications:  Current Outpatient Medications on File Prior to Visit   Medication Sig   • aspirin 81 MG EC tablet Take 81 mg by mouth Daily.   • atorvastatin (LIPITOR) 10 MG tablet Take 10 mg by mouth Daily.   • carvedilol (COREG) 12.5 MG tablet Take 12.5 mg by mouth 2 (two) times a day.   • furosemide (LASIX) 40 MG tablet Take 40 mg by mouth Daily.   • glimepiride (AMARYL) 2 MG tablet Take 2 mg by mouth 2 (two) times a day.   • lisinopril (PRINIVIL,ZESTRIL) 20 MG tablet Take 20 mg by mouth Daily.   • metFORMIN ER (GLUCOPHAGE-XR) 500 MG 24 hr tablet    • multivitamin with minerals tablet tablet Daily.   • nitroglycerin (NITRODUR) 0.4 MG/HR patch 0.4 patches Daily.   • omega-3 acid ethyl esters (LOVAZA) 1 g capsule Take 2 g by mouth 2 (two) times a day.   • potassium chloride (K-DUR,KLOR-CON) 20 MEQ CR tablet Take 20 mEq by mouth Daily.     No current facility-administered medications on file prior to visit.     Allergies:  Allergies   Allergen Reactions   • Penicillins Itching       Objective    Objective        Vitals:   Heart Rate:  [94-99] 99  BP: (132-149)/(70-78) 132/70  Body mass index is 30.12 kg/m².     Physical Exam:   Constitutional: Awake, alert, No acute distress    Eyes: PERRLA, sclerae anicteric, no conjunctival injection   HENT: NCAT, mucous membranes moist   Neck: Supple, no thyromegaly, no lymphadenopathy, trachea midline   Respiratory: Clear to auscultation bilaterally, nonlabored respirations    Cardiovascular: RRR, no murmurs or rubs. Palpable pedal pulses bilaterally   Musculoskeletal: No bilateral ankle edema, no cyanosis to extremities   Psychiatric: Appropriate affect, cooperative   Neurologic: Oriented x 3, strength symmetric in all extremities, Cranial Nerves grossly intact to confrontation, speech clear   Skin: No rashes.    Result Review    Result Review:  I have personally reviewed the available results from  [x]  Laboratory  [x]  EKG  [x]  Cardiology  [x]  Medications  [x]  Old records  []  Other:   Procedures  No results found for: CHOL  Lab Results   Component Value Date    TRIG 229 (H) 08/28/2019    TRIG 207 (H) 06/03/2019    TRIG 263 (H) 03/07/2019     Lab Results   Component Value Date    HDL 53 08/28/2019    HDL 44 06/03/2019    HDL 59 03/07/2019     Lab Results   Component Value Date    LDL 64 (L) 08/28/2019    LDL 66 (L) 06/03/2019    LDL 53 (L) 03/07/2019     Lab Results   Component Value Date    VLDL 46 (H) 08/28/2019    VLDL 41 (H) 06/03/2019    VLDL 53 (H) 03/07/2019        Impression/Plan:  1.  Nonischemic cardiomyopathy: Continue lisinopril 20 mg a day.  Continue carvedilol 12.5 mg BID.  2.  Chronic systolic heart failure stable: Continue Lasix 40 mg a day.  3.  Hyperlipidemia: Continue Lipitor 20 mg once a day.  Lipid profile reviewed.  4.  Presence of ICD: ICD check shows ICD is functioning normally.           Wil Montano MD   04/12/22   13:26 EDT

## 2022-04-12 ENCOUNTER — CLINICAL SUPPORT NO REQUIREMENTS (OUTPATIENT)
Dept: CARDIOLOGY | Facility: CLINIC | Age: 67
End: 2022-04-12

## 2022-04-12 ENCOUNTER — OFFICE VISIT (OUTPATIENT)
Dept: CARDIOLOGY | Facility: CLINIC | Age: 67
End: 2022-04-12

## 2022-04-12 VITALS
HEIGHT: 65 IN | SYSTOLIC BLOOD PRESSURE: 132 MMHG | WEIGHT: 181 LBS | DIASTOLIC BLOOD PRESSURE: 70 MMHG | HEART RATE: 99 BPM | BODY MASS INDEX: 30.16 KG/M2

## 2022-04-12 DIAGNOSIS — I42.0 NONISCHEMIC DILATED CARDIOMYOPATHY: Primary | ICD-10-CM

## 2022-04-12 DIAGNOSIS — Z95.810 PRESENCE OF IMPLANTABLE CARDIOVERTER-DEFIBRILLATOR (ICD): ICD-10-CM

## 2022-04-12 DIAGNOSIS — I50.22 SYSTOLIC CHF, CHRONIC: ICD-10-CM

## 2022-04-12 DIAGNOSIS — E78.2 HYPERLIPEMIA, MIXED: ICD-10-CM

## 2022-04-12 PROCEDURE — 93282 PRGRMG EVAL IMPLANTABLE DFB: CPT | Performed by: SPECIALIST

## 2022-04-12 PROCEDURE — 99214 OFFICE O/P EST MOD 30 MIN: CPT | Performed by: SPECIALIST

## 2022-04-12 NOTE — PROGRESS NOTES
Normal VVI ICD Device Interrogation and Device Testing.  Normal evaluation of device function and lead measurements.  No optimization was needed of parameters or maximization of device longevity.  Patient is on automated Home Remote Monitoring.  2 NS-VT episodes that were in January, no new episodes. Remaining battery is 2 1/2 years.

## 2022-11-13 NOTE — PROGRESS NOTES
[facility]  Cardiology progress Note    Patient Name: Geeta Blair  : 1955    CHIEF COMPLAINT  Cardiomyopathy        Subjective   Subjective     HISTORY OF PRESENT ILLNESS    Geeta Blair is a 67 y.o. female with history of nonischemic cardiomyopathy.  No chest pain or shortness of breath.    REVIEW OF SYSTEMS    Constitutional:    No fever, no weight loss  Skin:     No rash  Otolaryngeal:    No difficulty swallowing  Cardiovascular: See HPI.  Pulmonary:    No cough, no sputum production    Personal History     Social History:    reports that she has never smoked. She has never used smokeless tobacco. She reports that she does not currently use alcohol. She reports that she does not use drugs.    Home Medications:  Current Outpatient Medications on File Prior to Visit   Medication Sig   • aspirin 81 MG EC tablet Take 81 mg by mouth Daily.   • atorvastatin (LIPITOR) 10 MG tablet Take 10 mg by mouth Daily.   • carvedilol (COREG) 12.5 MG tablet Take 12.5 mg by mouth 2 (two) times a day.   • furosemide (LASIX) 40 MG tablet Take 40 mg by mouth Daily.   • glimepiride (AMARYL) 2 MG tablet Take 2 mg by mouth 2 (two) times a day.   • lisinopril (PRINIVIL,ZESTRIL) 20 MG tablet Take 20 mg by mouth Daily.   • metFORMIN ER (GLUCOPHAGE-XR) 500 MG 24 hr tablet    • multivitamin with minerals tablet tablet Daily.   • nitroglycerin (NITRODUR) 0.4 MG/HR patch 0.4 patches Daily.   • omega-3 acid ethyl esters (LOVAZA) 1 g capsule Take 2 g by mouth 2 (two) times a day.   • potassium chloride (K-DUR,KLOR-CON) 20 MEQ CR tablet Take 20 mEq by mouth Daily.     No current facility-administered medications on file prior to visit.       Past Medical History:   Diagnosis Date   • Diabetes (HCC)    • Heart disease    • High cholesterol    • Hypertension    • Pacemaker        Allergies:  Allergies   Allergen Reactions   • Penicillins Itching       Objective    Objective       Vitals:   Heart Rate:  [82-84] 82  BP:  (160-164)/(86-90) 160/86  Body mass index is 28.96 kg/m².     PHYSICAL EXAM:    General Appearance:   · well developed  · well nourished  HENT:   · oropharynx moist  · lips not cyanotic  Neck:  · thyroid not enlarged  · supple  Respiratory:  · no respiratory distress  · normal breath sounds  · no rales  Cardiovascular:  · no jugular venous distention  · regular rhythm  · apical impulse normal  · S1 normal, S2 normal  · no S3, no S4   · no murmur  · no rub, no thrill  · carotid pulses normal; no bruit  · pedal pulses normal  · lower extremity edema: none    Skin:   · warm, dry  Psychiatric:  · judgement and insight appropriate  · normal mood and affect        Result Review:  I have personally reviewed the available results from  [x]  Laboratory  [x]  EKG  [x]  Cardiology  [x]  Medications  [x]  Old records  []  Other:     Procedures  No results found for: CHOL  Lab Results   Component Value Date    TRIG 229 (H) 08/28/2019    TRIG 207 (H) 06/03/2019    TRIG 263 (H) 03/07/2019     Lab Results   Component Value Date    HDL 53 08/28/2019    HDL 44 06/03/2019    HDL 59 03/07/2019     Lab Results   Component Value Date    LDL 64 (L) 08/28/2019    LDL 66 (L) 06/03/2019    LDL 53 (L) 03/07/2019     Lab Results   Component Value Date    VLDL 46 (H) 08/28/2019    VLDL 41 (H) 06/03/2019    VLDL 53 (H) 03/07/2019        Impression/Plan:  1.  Nonischemic cardiomyopathy stable: Continue lisinopril 20 mg a day.  Continue carvedilol 12.5 mg twice daily.  2.  Presence of ICD: ICD check shows ICD functioning normally.  No ICD shocks.  3.  Hyperlipidemia: Current Lipitor 20 mg a day.  Lipid profile reviewed.  4.  Chronic systolic heart failure stable: Continue Lasix 40 mg once a day.  5.  Essential hypertension controlled: Blood pressures well controlled at home as per the patient continue lisinopril 20 mg a day.  Monitor blood pressure regularly.        Wil Montano MD   11/15/22   13:24 EST

## 2022-11-15 ENCOUNTER — OFFICE VISIT (OUTPATIENT)
Dept: CARDIOLOGY | Facility: CLINIC | Age: 67
End: 2022-11-15

## 2022-11-15 VITALS
DIASTOLIC BLOOD PRESSURE: 86 MMHG | HEIGHT: 65 IN | WEIGHT: 174 LBS | SYSTOLIC BLOOD PRESSURE: 160 MMHG | HEART RATE: 82 BPM | BODY MASS INDEX: 28.99 KG/M2

## 2022-11-15 DIAGNOSIS — I42.0 NONISCHEMIC DILATED CARDIOMYOPATHY: Primary | ICD-10-CM

## 2022-11-15 DIAGNOSIS — I50.22 SYSTOLIC CHF, CHRONIC: ICD-10-CM

## 2022-11-15 DIAGNOSIS — E78.2 HYPERLIPEMIA, MIXED: ICD-10-CM

## 2022-11-15 DIAGNOSIS — Z95.810 PRESENCE OF IMPLANTABLE CARDIOVERTER-DEFIBRILLATOR (ICD): ICD-10-CM

## 2022-11-15 PROCEDURE — 99214 OFFICE O/P EST MOD 30 MIN: CPT | Performed by: SPECIALIST

## 2023-01-05 ENCOUNTER — TELEPHONE (OUTPATIENT)
Dept: CARDIOLOGY | Facility: CLINIC | Age: 68
End: 2023-01-05
Payer: MEDICARE

## 2023-01-05 NOTE — TELEPHONE ENCOUNTER
RENATA patient. Patient at dentist and her /100. Patient states it also showed an irregular rhythm notification. Patient states she is very anxious. Advised patient when she got home and settled down to recheck BP and to do a download and notify office of BP. Patient voiced understanding.

## 2023-06-02 NOTE — PROGRESS NOTES
[facility]  Cardiology progress Note    Patient Name: Geeta Blair  : 1955    CHIEF COMPLAINT  Cardiomyopathy        Subjective   Subjective     HISTORY OF PRESENT ILLNESS    Geeta Blair is a 67 y.o. female with history of nonischemic cardiomyopathy.  No chest pain.    REVIEW OF SYSTEMS    Constitutional:    No fever, no weight loss  Skin:     No rash  Otolaryngeal:    No difficulty swallowing  Cardiovascular: See HPI.  Pulmonary:    No cough, no sputum production    Personal History     Social History:    reports that she has never smoked. She has never used smokeless tobacco. She reports that she does not currently use alcohol. She reports that she does not use drugs.    Home Medications:  Current Outpatient Medications on File Prior to Visit   Medication Sig    aspirin 81 MG EC tablet Take 1 tablet by mouth Daily.    atorvastatin (LIPITOR) 10 MG tablet Take 1 tablet by mouth Daily.    carvedilol (COREG) 12.5 MG tablet Take 1 tablet by mouth 2 (two) times a day.    furosemide (LASIX) 40 MG tablet Take 1 tablet by mouth Daily.    glimepiride (AMARYL) 2 MG tablet Take 1 tablet by mouth 2 (two) times a day.    lisinopril (PRINIVIL,ZESTRIL) 40 MG tablet Take 20 mg by mouth 2 (Two) Times a Day.    metFORMIN ER (GLUCOPHAGE-XR) 500 MG 24 hr tablet     multivitamin with minerals tablet tablet Daily.    nitroglycerin (NITRODUR) 0.4 MG/HR patch 0.4 patches Daily.    omega-3 acid ethyl esters (LOVAZA) 1 g capsule Take 2 capsules by mouth 2 (two) times a day.    potassium chloride (K-DUR,KLOR-CON) 20 MEQ CR tablet Take 1 tablet by mouth Daily.    [DISCONTINUED] lisinopril (PRINIVIL,ZESTRIL) 20 MG tablet Take 20 mg by mouth Daily.     No current facility-administered medications on file prior to visit.       Past Medical History:   Diagnosis Date    Diabetes     Heart disease     High cholesterol     Hypertension     Pacemaker        Allergies:  Allergies   Allergen Reactions    Penicillins Itching        Objective    Objective       Vitals:   Heart Rate:  [78] 78  BP: (158)/(90) 158/90  Body mass index is 29.45 kg/m².     PHYSICAL EXAM:    General Appearance:   well developed  well nourished  HENT:   oropharynx moist  lips not cyanotic  Neck:  thyroid not enlarged  supple  Respiratory:  no respiratory distress  normal breath sounds  no rales  Cardiovascular:  no jugular venous distention  regular rhythm  apical impulse normal  S1 normal, S2 normal  no S3, no S4   no murmur  no rub, no thrill  carotid pulses normal; no bruit  pedal pulses normal  lower extremity edema: none    Skin:   warm, dry  Psychiatric:  judgement and insight appropriate  normal mood and affect        Result Review:  I have personally reviewed the available results from  [x]  Laboratory  [x]  EKG  [x]  Cardiology  [x]  Medications  [x]  Old records  []  Other:     Procedures    Impression/Plan:  1.  Nonischemic cardiomyopathy stable: Continue lisinopril 20 mg twice a day.  Continue carvedilol 12.5 mg twice a day.  2.  Chronic systolic heart failure stable: Continue Lasix 40 mg once a day.  3.  Presence of ICD: ICD check shows ICD is functioning normally.  No ICD shocks.  4.  Hyperlipidemia: Continue Lipitor 20 mg once a day.  Monitor lipid and hepatic profile.  5.  Essential hypertension controlled: Continue lisinopril 20 mg twice a day.  Blood pressure well controlled at home           Wil Montano MD   06/06/23   11:41 EDT

## 2023-06-06 ENCOUNTER — CLINICAL SUPPORT NO REQUIREMENTS (OUTPATIENT)
Dept: CARDIOLOGY | Facility: CLINIC | Age: 68
End: 2023-06-06
Payer: MEDICARE

## 2023-06-06 ENCOUNTER — OFFICE VISIT (OUTPATIENT)
Dept: CARDIOLOGY | Facility: CLINIC | Age: 68
End: 2023-06-06
Payer: MEDICARE

## 2023-06-06 VITALS
SYSTOLIC BLOOD PRESSURE: 158 MMHG | HEART RATE: 78 BPM | WEIGHT: 177 LBS | BODY MASS INDEX: 29.49 KG/M2 | DIASTOLIC BLOOD PRESSURE: 90 MMHG | HEIGHT: 65 IN

## 2023-06-06 DIAGNOSIS — I42.0 NONISCHEMIC DILATED CARDIOMYOPATHY: Primary | ICD-10-CM

## 2023-06-06 DIAGNOSIS — I50.22 SYSTOLIC CHF, CHRONIC: ICD-10-CM

## 2023-06-06 DIAGNOSIS — I10 HYPERTENSION, ESSENTIAL: ICD-10-CM

## 2023-06-06 DIAGNOSIS — E78.2 HYPERLIPEMIA, MIXED: ICD-10-CM

## 2023-06-06 RX ORDER — LISINOPRIL 40 MG/1
20 TABLET ORAL 2 TIMES DAILY
COMMUNITY
Start: 2023-04-25

## 2023-06-06 NOTE — PROGRESS NOTES
Normal VVIR Chamber ICD Device Interrogation and Device Testing.  Normal evaluation of device function and lead measurements.  No optimization was needed of parameters or maximization of device longevity.  Patient is on automated Home Remote Monitoring.  Remaining battery is 1 1/2 years.  No episodes and no alerts.

## 2024-02-03 NOTE — PROGRESS NOTES
[facility]  Cardiology progress Note    Patient Name: Geeta Blair  : 1955    CHIEF COMPLAINT  Cardiomyopathy        Subjective   Subjective     HISTORY OF PRESENT ILLNESS    Geeta Blair is a 68 y.o. female with history of cardiomyopathy.  No chest pain or shortness of breath.    REVIEW OF SYSTEMS    Constitutional:    No fever, no weight loss  Skin:     No rash  Otolaryngeal:    No difficulty swallowing  Cardiovascular: See HPI.  Pulmonary:    No cough, no sputum production    Personal History     Social History:    reports that she has never smoked. She has never used smokeless tobacco. She reports that she does not currently use alcohol. She reports that she does not use drugs.    Home Medications:  Current Outpatient Medications on File Prior to Visit   Medication Sig    aspirin 81 MG EC tablet Take 1 tablet by mouth Daily.    atorvastatin (LIPITOR) 10 MG tablet Take 1 tablet by mouth Daily.    carvedilol (COREG) 12.5 MG tablet Take 1 tablet by mouth 2 (two) times a day.    furosemide (LASIX) 40 MG tablet Take 1 tablet by mouth Daily.    glimepiride (AMARYL) 2 MG tablet Take 1 tablet by mouth 2 (two) times a day.    lisinopril (PRINIVIL,ZESTRIL) 40 MG tablet Take 1 tablet by mouth Daily.    metFORMIN ER (GLUCOPHAGE-XR) 500 MG 24 hr tablet     multivitamin with minerals tablet tablet Daily.    nitroglycerin (NITRODUR) 0.4 MG/HR patch 0.4 patches Daily.    omega-3 acid ethyl esters (LOVAZA) 1 g capsule Take 2 capsules by mouth 2 (two) times a day.    potassium chloride (K-DUR,KLOR-CON) 20 MEQ CR tablet Take 1 tablet by mouth Daily.     No current facility-administered medications on file prior to visit.       Past Medical History:   Diagnosis Date    Chronic systolic (congestive) heart failure     Diabetes     Heart disease     High cholesterol     Hypertension     ICD (implantable cardioverter-defibrillator), single, in situ     Nonischemic cardiomyopathy        Allergies:  Allergies  "  Allergen Reactions    Penicillins Itching       Objective    Objective       Vitals:   Heart Rate:  [84] 84  BP: (181)/(111) 181/111  Body mass index is 29.25 kg/m².     PHYSICAL EXAM:    General Appearance:   well developed  well nourished  HENT:   oropharynx moist  lips not cyanotic  Neck:  thyroid not enlarged  supple  Respiratory:  no respiratory distress  normal breath sounds  no rales  Cardiovascular:  no jugular venous distention  regular rhythm  apical impulse normal  S1 normal, S2 normal  no S3, no S4   no murmur  no rub, no thrill  carotid pulses normal; no bruit  pedal pulses normal  lower extremity edema: none    Skin:   warm, dry  Psychiatric:  judgement and insight appropriate  normal mood and affect        Result Review:  I have personally reviewed the available results from  [x]  Laboratory  [x]  EKG  [x]  Cardiology  [x]  Medications  [x]  Old records  []  Other:     Procedures  No results found for: \"CHOL\"  Lab Results   Component Value Date    TRIG 229 (H) 08/28/2019    TRIG 207 (H) 06/03/2019    TRIG 263 (H) 03/07/2019     Lab Results   Component Value Date    HDL 53 08/28/2019    HDL 44 06/03/2019    HDL 59 03/07/2019     Lab Results   Component Value Date    LDL 64 (L) 08/28/2019    LDL 66 (L) 06/03/2019    LDL 53 (L) 03/07/2019     Lab Results   Component Value Date    VLDL 46 (H) 08/28/2019    VLDL 41 (H) 06/03/2019    VLDL 53 (H) 03/07/2019        Impression/Plan:  1.  Nonischemic cardiomyopathy stable: Continue lisinopril 20 mg twice a day.  Continue carvedilol 12.5 mg twice a day.  2.  Chronic systolic heart failure stable: Continue Lasix 40 mg once a day.  3.  Presence of ICD: ICD check shows ICD is functioning normally.  No ICD shocks.  4.  Mixed hyperlipidemia: Continue Lipitor 10 mg once a day.  Monitor lipid and hepatic profile.  5.  Essential hypertension controlled: Continue lisinopril 20 mg twice a day.  Continue carvedilol 12.5 mg twice a day.  Blood pressure well controlled at " home              Wil Montano MD   02/06/24   12:48 EST

## 2024-02-06 ENCOUNTER — OFFICE VISIT (OUTPATIENT)
Dept: CARDIOLOGY | Facility: CLINIC | Age: 69
End: 2024-02-06
Payer: MEDICARE

## 2024-02-06 VITALS
HEART RATE: 84 BPM | SYSTOLIC BLOOD PRESSURE: 181 MMHG | WEIGHT: 175.8 LBS | BODY MASS INDEX: 29.29 KG/M2 | HEIGHT: 65 IN | DIASTOLIC BLOOD PRESSURE: 111 MMHG

## 2024-02-06 DIAGNOSIS — I42.0 NONISCHEMIC DILATED CARDIOMYOPATHY: Primary | ICD-10-CM

## 2024-02-06 DIAGNOSIS — E78.2 MIXED HYPERLIPIDEMIA: ICD-10-CM

## 2024-02-06 DIAGNOSIS — I50.22 SYSTOLIC CHF, CHRONIC: ICD-10-CM

## 2024-02-06 DIAGNOSIS — I10 HYPERTENSION, ESSENTIAL: ICD-10-CM

## 2024-02-06 DIAGNOSIS — Z95.810 PRESENCE OF IMPLANTABLE CARDIOVERTER-DEFIBRILLATOR (ICD): ICD-10-CM

## 2024-05-02 ENCOUNTER — TELEPHONE (OUTPATIENT)
Dept: CARDIOLOGY | Facility: CLINIC | Age: 69
End: 2024-05-02
Payer: MEDICARE

## 2024-05-02 NOTE — TELEPHONE ENCOUNTER
Blood pressures over the last week are well controlled. She only needs to check BP 1-2 times per day. Continue current meds

## 2024-06-06 ENCOUNTER — HOSPITAL ENCOUNTER (OUTPATIENT)
Dept: CARDIOLOGY | Facility: HOSPITAL | Age: 69
Discharge: HOME | End: 2024-06-06
Payer: MEDICARE

## 2024-06-06 DIAGNOSIS — I42.0 NONISCHEMIC DILATED CARDIOMYOPATHY: ICD-10-CM

## 2024-06-06 LAB
AORTIC DIMENSIONLESS INDEX: 0.58 (DI)
ASCENDING AORTA: 3.2 CM
BH CV ECHO MEAS - ACS: 1.8 CM
BH CV ECHO MEAS - AO MAX PG: 4.7 MMHG
BH CV ECHO MEAS - AO MEAN PG: 3 MMHG
BH CV ECHO MEAS - AO ROOT DIAM: 2.8 CM
BH CV ECHO MEAS - AO V2 MAX: 108 CM/SEC
BH CV ECHO MEAS - AO V2 VTI: 22 CM
BH CV ECHO MEAS - AVA(I,D): 2.02 CM2
BH CV ECHO MEAS - EDV(CUBED): 157.5 ML
BH CV ECHO MEAS - EDV(MOD-SP2): 87.4 ML
BH CV ECHO MEAS - EDV(MOD-SP4): 104 ML
BH CV ECHO MEAS - EF(MOD-BP): 50.5 %
BH CV ECHO MEAS - EF(MOD-SP2): 48.5 %
BH CV ECHO MEAS - EF(MOD-SP4): 51.7 %
BH CV ECHO MEAS - ESV(CUBED): 54.9 ML
BH CV ECHO MEAS - ESV(MOD-SP2): 45 ML
BH CV ECHO MEAS - ESV(MOD-SP4): 50.2 ML
BH CV ECHO MEAS - FS: 29.6 %
BH CV ECHO MEAS - IVS/LVPW: 1.5 CM
BH CV ECHO MEAS - IVSD: 1.2 CM
BH CV ECHO MEAS - LAT PEAK E' VEL: 11.7 CM/SEC
BH CV ECHO MEAS - LV MASS(C)D: 206.7 GRAMS
BH CV ECHO MEAS - LV MAX PG: 2.37 MMHG
BH CV ECHO MEAS - LV MEAN PG: 1 MMHG
BH CV ECHO MEAS - LV V1 MAX: 77 CM/SEC
BH CV ECHO MEAS - LV V1 VTI: 12.8 CM
BH CV ECHO MEAS - LVIDD: 5.4 CM
BH CV ECHO MEAS - LVIDS: 3.8 CM
BH CV ECHO MEAS - LVOT AREA: 3.5 CM2
BH CV ECHO MEAS - LVOT DIAM: 2.1 CM
BH CV ECHO MEAS - LVPWD: 0.8 CM
BH CV ECHO MEAS - MED PEAK E' VEL: 7.7 CM/SEC
BH CV ECHO MEAS - MR MAX PG: 107.1 MMHG
BH CV ECHO MEAS - MR MAX VEL: 517.5 CM/SEC
BH CV ECHO MEAS - MV A MAX VEL: 103 CM/SEC
BH CV ECHO MEAS - MV DEC SLOPE: 1043 CM/SEC2
BH CV ECHO MEAS - MV DEC TIME: 0.22 SEC
BH CV ECHO MEAS - MV E MAX VEL: 69.2 CM/SEC
BH CV ECHO MEAS - MV E/A: 0.67
BH CV ECHO MEAS - MV MEAN PG: 2 MMHG
BH CV ECHO MEAS - MV P1/2T: 28.6 MSEC
BH CV ECHO MEAS - MV V2 VTI: 15.7 CM
BH CV ECHO MEAS - MVA(P1/2T): 7.7 CM2
BH CV ECHO MEAS - MVA(VTI): 2.8 CM2
BH CV ECHO MEAS - PA V2 MAX: 93 CM/SEC
BH CV ECHO MEAS - PULM A REVS DUR: 0.13 SEC
BH CV ECHO MEAS - PULM A REVS VEL: 35 CM/SEC
BH CV ECHO MEAS - PULM DIAS VEL: 46.3 CM/SEC
BH CV ECHO MEAS - PULM S/D: 1.56
BH CV ECHO MEAS - PULM SYS VEL: 72.4 CM/SEC
BH CV ECHO MEAS - QP/QS: 0.98
BH CV ECHO MEAS - RAP SYSTOLE: 5 MMHG
BH CV ECHO MEAS - RV MAX PG: 1.8 MMHG
BH CV ECHO MEAS - RV V1 MAX: 67.1 CM/SEC
BH CV ECHO MEAS - RV V1 VTI: 12.5 CM
BH CV ECHO MEAS - RVDD: 3.3 CM
BH CV ECHO MEAS - RVOT DIAM: 2.1 CM
BH CV ECHO MEAS - RVSP: 36.1 MMHG
BH CV ECHO MEAS - SV(LVOT): 44.3 ML
BH CV ECHO MEAS - SV(MOD-SP2): 42.4 ML
BH CV ECHO MEAS - SV(MOD-SP4): 53.8 ML
BH CV ECHO MEAS - SV(RVOT): 43.3 ML
BH CV ECHO MEAS - TAPSE (>1.6): 1.6 CM
BH CV ECHO MEAS - TR MAX PG: 31.1 MMHG
BH CV ECHO MEAS - TR MAX VEL: 279 CM/SEC
BH CV ECHO MEASUREMENTS AVERAGE E/E' RATIO: 7.13
BH CV XLRA - TDI S': 11.6 CM/SEC
IVRT: 55 MS
LEFT ATRIUM VOLUME INDEX: 32.7 ML/M2
LV EF 2D ECHO EST: 45 %

## 2024-06-06 PROCEDURE — 93306 TTE W/DOPPLER COMPLETE: CPT

## 2024-06-07 ENCOUNTER — TELEPHONE (OUTPATIENT)
Dept: CARDIOLOGY | Facility: CLINIC | Age: 69
End: 2024-06-07
Payer: MEDICARE

## 2024-06-07 NOTE — TELEPHONE ENCOUNTER
----- Message from Meme Rosado sent at 6/7/2024  2:09 PM EDT -----  Echocardiogram shows mildly reduced LV function with a EF of 45%, and mild mitral regurgitation and tricuspid regurgitation, these are similar findings to previous echocardiogram.  Continue current cardiac medications and keep previously scheduled routine follow-up visit.

## 2024-08-29 NOTE — PROGRESS NOTES
[facility]  Cardiology progress Note    Patient Name: Geeta Blair  : 1955    CHIEF COMPLAINT  Cardiomyopathy        Subjective   Subjective     HISTORY OF PRESENT ILLNESS    Geeta Blair is a 69 y.o. female with history of cardiomyopathy and CHF.  No chest pain.    REVIEW OF SYSTEMS    Constitutional:    No fever, no weight loss  Skin:     No rash  Otolaryngeal:    No difficulty swallowing  Cardiovascular: See HPI.  Pulmonary:    No cough, no sputum production    Personal History     Social History:    reports that she has never smoked. She has never used smokeless tobacco. She reports that she does not currently use alcohol. She reports that she does not use drugs.    Home Medications:  Current Outpatient Medications on File Prior to Visit   Medication Sig    aspirin 81 MG EC tablet Take 1 tablet by mouth Daily.    atorvastatin (LIPITOR) 10 MG tablet Take 1 tablet by mouth Daily.    carvedilol (COREG) 12.5 MG tablet Take 1 tablet by mouth 2 (two) times a day.    furosemide (LASIX) 40 MG tablet Take 1 tablet by mouth Daily.    glimepiride (AMARYL) 2 MG tablet Take 1 tablet by mouth 2 (two) times a day.    lisinopril (PRINIVIL,ZESTRIL) 40 MG tablet Take 1 tablet by mouth Daily.    metFORMIN ER (GLUCOPHAGE-XR) 500 MG 24 hr tablet     multivitamin with minerals tablet tablet Daily.    nitroglycerin (NITRODUR) 0.4 MG/HR patch 0.4 patches Daily.    omega-3 acid ethyl esters (LOVAZA) 1 g capsule Take 2 capsules by mouth 2 (two) times a day.    potassium chloride (K-DUR,KLOR-CON) 20 MEQ CR tablet Take 1 tablet by mouth Daily.     No current facility-administered medications on file prior to visit.       Past Medical History:   Diagnosis Date    Chronic systolic (congestive) heart failure     Diabetes     Heart disease     High cholesterol     Hypertension     ICD (implantable cardioverter-defibrillator), single, in situ     Nonischemic cardiomyopathy        Allergies:  Allergies   Allergen  "Reactions    Penicillins Itching       Objective    Objective       Vitals:   Heart Rate:  [109] 109  BP: (161)/(93) 161/93  Body mass index is 28.96 kg/m².     PHYSICAL EXAM:    General Appearance:   well developed  well nourished  HENT:   oropharynx moist  lips not cyanotic  Neck:  thyroid not enlarged  supple  Respiratory:  no respiratory distress  normal breath sounds  no rales  Cardiovascular:  no jugular venous distention  regular rhythm  apical impulse normal  S1 normal, S2 normal  no S3, no S4   no murmur  no rub, no thrill  carotid pulses normal; no bruit  pedal pulses normal  lower extremity edema: none    Skin:   warm, dry  Psychiatric:  judgement and insight appropriate  normal mood and affect        Result Review:  I have personally reviewed the available results from  [x]  Laboratory  [x]  EKG  [x]  Cardiology  [x]  Medications  [x]  Old records  []  Other:     Procedures  No results found for: \"CHOL\"  Lab Results   Component Value Date    TRIG 229 (H) 08/28/2019    TRIG 207 (H) 06/03/2019    TRIG 263 (H) 03/07/2019     Lab Results   Component Value Date    HDL 53 08/28/2019    HDL 44 06/03/2019    HDL 59 03/07/2019     Lab Results   Component Value Date    LDL 64 (L) 08/28/2019    LDL 66 (L) 06/03/2019    LDL 53 (L) 03/07/2019     Lab Results   Component Value Date    VLDL 46 (H) 08/28/2019    VLDL 41 (H) 06/03/2019    VLDL 53 (H) 03/07/2019     Results for orders placed during the hospital encounter of 06/06/24    Adult Transthoracic Echo Complete W/ Cont if Necessary Per Protocol    Interpretation Summary  Diffuse hypokinesis of the left ventricle with mildly reduced left ventricular systolic function ejection fraction of 45%.  Mild MR and mild TR.  ICD lead in the right ventricle.     Impression/Plan:   1.  Nonischemic cardiomyopathy stable: Continue lisinopril 20 mg twice a day.  Continue carvedilol 12.5 mg twice a day.  2.  Chronic systolic heart failure stable: Continue Lasix 40 mg once a " day.  3.  Presence of ICD: ICD check shows ICD is functioning normally.  No ICD shocks.  4.  Mixed hyperlipidemia: Continue Lipitor 10 mg once a day.  Monitor lipid and hepatic profile.  5.  Essential hypertension controlled: Continue lisinopril 20 mg twice a day.  Continue carvedilol 12.5 mg twice a day.  Blood pressure well controlled at home           Wil Montano MD   09/03/24   11:43 EDT

## 2024-09-03 ENCOUNTER — CLINICAL SUPPORT NO REQUIREMENTS (OUTPATIENT)
Dept: CARDIOLOGY | Facility: CLINIC | Age: 69
End: 2024-09-03
Payer: MEDICARE

## 2024-09-03 ENCOUNTER — OFFICE VISIT (OUTPATIENT)
Dept: CARDIOLOGY | Facility: CLINIC | Age: 69
End: 2024-09-03
Payer: MEDICARE

## 2024-09-03 VITALS
HEIGHT: 65 IN | WEIGHT: 174 LBS | DIASTOLIC BLOOD PRESSURE: 93 MMHG | HEART RATE: 109 BPM | SYSTOLIC BLOOD PRESSURE: 161 MMHG | BODY MASS INDEX: 28.99 KG/M2

## 2024-09-03 VITALS
HEART RATE: 109 BPM | DIASTOLIC BLOOD PRESSURE: 93 MMHG | HEIGHT: 65 IN | WEIGHT: 174 LBS | SYSTOLIC BLOOD PRESSURE: 161 MMHG | BODY MASS INDEX: 28.99 KG/M2

## 2024-09-03 DIAGNOSIS — Z95.810 PRESENCE OF IMPLANTABLE CARDIOVERTER-DEFIBRILLATOR (ICD): ICD-10-CM

## 2024-09-03 DIAGNOSIS — I50.22 SYSTOLIC CHF, CHRONIC: ICD-10-CM

## 2024-09-03 DIAGNOSIS — I42.0 NONISCHEMIC DILATED CARDIOMYOPATHY: Primary | ICD-10-CM

## 2024-09-03 DIAGNOSIS — I10 HYPERTENSION, ESSENTIAL: ICD-10-CM

## 2024-09-03 DIAGNOSIS — I42.0 NONISCHEMIC DILATED CARDIOMYOPATHY: ICD-10-CM

## 2024-09-03 DIAGNOSIS — Z95.810 PRESENCE OF IMPLANTABLE CARDIOVERTER-DEFIBRILLATOR (ICD): Primary | ICD-10-CM

## 2024-09-03 NOTE — PROGRESS NOTES
Normal VVI ICD Device Interrogation and Device Testing.  Normal evaluation of device function and lead measurements.  No optimization was needed of parameters or maximization of device longevity.  Patient is on automated Home Remote Monitoring.

## 2025-03-06 NOTE — PROGRESS NOTES
[facility]  Cardiology progress Note    Patient Name: Geeta Blair  : 1955    CHIEF COMPLAINT  Cardiomyopathy        Subjective   Subjective     HISTORY OF PRESENT ILLNESS    Geeta Blair is a 69 y.o. female with history of cardiomyopathy.    REVIEW OF SYSTEMS    Constitutional:    No fever, no weight loss  Skin:     No rash  Otolaryngeal:    No difficulty swallowing  Cardiovascular: See HPI.  Pulmonary:    No cough, no sputum production    Personal History     Social History:    reports that she has never smoked. She has never used smokeless tobacco. She reports that she does not currently use alcohol. She reports that she does not use drugs.    Home Medications:  Current Outpatient Medications on File Prior to Visit   Medication Sig    aspirin 81 MG EC tablet Take 1 tablet by mouth Daily.    atorvastatin (LIPITOR) 10 MG tablet Take 1 tablet by mouth Daily.    carvedilol (COREG) 12.5 MG tablet Take 1 tablet by mouth 2 (two) times a day.    furosemide (LASIX) 40 MG tablet Take 1 tablet by mouth Daily.    glimepiride (AMARYL) 2 MG tablet Take 1 tablet by mouth 2 (two) times a day.    lisinopril (PRINIVIL,ZESTRIL) 40 MG tablet Take 1 tablet by mouth Daily.    metFORMIN ER (GLUCOPHAGE-XR) 500 MG 24 hr tablet     multivitamin with minerals tablet tablet Daily.    nitroglycerin (NITRODUR) 0.4 MG/HR patch 0.4 patches Daily.    omega-3 acid ethyl esters (LOVAZA) 1 g capsule Take 2 capsules by mouth 2 (two) times a day.    potassium chloride (K-DUR,KLOR-CON) 20 MEQ CR tablet Take 1 tablet by mouth Daily.     No current facility-administered medications on file prior to visit.       Past Medical History:   Diagnosis Date    Chronic systolic (congestive) heart failure     Diabetes     Heart disease     High cholesterol     Hypertension     ICD (implantable cardioverter-defibrillator), single, in situ     Nonischemic cardiomyopathy        Allergies:  Allergies   Allergen Reactions    Penicillins  "Itching       Objective    Objective       Vitals:   Heart Rate:  [77] 77  BP: (147)/(98) 147/98  Body mass index is 29.02 kg/m².     PHYSICAL EXAM:    General Appearance:   well developed  well nourished  HENT:   oropharynx moist  lips not cyanotic  Neck:  thyroid not enlarged  supple  Respiratory:  no respiratory distress  normal breath sounds  no rales  Cardiovascular:  no jugular venous distention  regular rhythm  apical impulse normal  S1 normal, S2 normal  no S3, no S4   no murmur  no rub, no thrill  carotid pulses normal; no bruit  pedal pulses normal  lower extremity edema: none    Skin:   warm, dry  Psychiatric:  judgement and insight appropriate  normal mood and affect        Result Review:  I have personally reviewed the available results from  [x]  Laboratory  [x]  EKG  [x]  Cardiology  [x]  Medications  [x]  Old records  []  Other:     Procedures  No results found for: \"CHOL\"  Lab Results   Component Value Date    TRIG 229 (H) 08/28/2019    TRIG 207 (H) 06/03/2019    TRIG 263 (H) 03/07/2019     Lab Results   Component Value Date    HDL 53 08/28/2019    HDL 44 06/03/2019    HDL 59 03/07/2019     Lab Results   Component Value Date    LDL 64 (L) 08/28/2019    LDL 66 (L) 06/03/2019    LDL 53 (L) 03/07/2019     Lab Results   Component Value Date    VLDL 46 (H) 08/28/2019    VLDL 41 (H) 06/03/2019    VLDL 53 (H) 03/07/2019     Results for orders placed during the hospital encounter of 06/06/24    Adult Transthoracic Echo Complete W/ Cont if Necessary Per Protocol    Interpretation Summary  Diffuse hypokinesis of the left ventricle with mildly reduced left ventricular systolic function ejection fraction of 45%.  Mild MR and mild TR.  ICD lead in the right ventricle.     Impression/Plan:   1.  Nonischemic cardiomyopathy stable: Continue lisinopril 20 mg twice a day.  Continue carvedilol 12.5 mg twice a day.  2.  Chronic systolic heart failure stable: Continue Lasix 40 mg once a day.  3.  Presence of ICD: ICD " check shows ICD is functioning normally.  No ICD shocks.  4.  Mixed hyperlipidemia: Continue Lipitor 10 mg once a day.  Monitor lipid and hepatic profile.  5.  Essential hypertension controlled: Continue lisinopril 20 mg twice a day.  Continue carvedilol 12.5 mg twice a day.  Blood pressure well controlled at home              Wil Montano MD   03/11/25   11:58 EDT

## 2025-03-11 ENCOUNTER — OFFICE VISIT (OUTPATIENT)
Dept: CARDIOLOGY | Facility: CLINIC | Age: 70
End: 2025-03-11
Payer: MEDICARE

## 2025-03-11 VITALS
HEART RATE: 77 BPM | WEIGHT: 174.4 LBS | SYSTOLIC BLOOD PRESSURE: 147 MMHG | HEIGHT: 65 IN | BODY MASS INDEX: 29.06 KG/M2 | DIASTOLIC BLOOD PRESSURE: 98 MMHG

## 2025-03-11 DIAGNOSIS — I50.22 SYSTOLIC CHF, CHRONIC: ICD-10-CM

## 2025-03-11 DIAGNOSIS — Z95.810 PRESENCE OF IMPLANTABLE CARDIOVERTER-DEFIBRILLATOR (ICD): ICD-10-CM

## 2025-03-11 DIAGNOSIS — E78.2 HYPERLIPEMIA, MIXED: ICD-10-CM

## 2025-03-11 DIAGNOSIS — I10 HYPERTENSION, ESSENTIAL: ICD-10-CM

## 2025-03-11 DIAGNOSIS — I42.0 NONISCHEMIC DILATED CARDIOMYOPATHY: Primary | ICD-10-CM

## 2025-03-31 ENCOUNTER — PREP FOR SURGERY (OUTPATIENT)
Dept: OTHER | Facility: HOSPITAL | Age: 70
End: 2025-03-31
Payer: MEDICARE

## 2025-03-31 ENCOUNTER — TELEPHONE (OUTPATIENT)
Dept: CARDIOLOGY | Facility: CLINIC | Age: 70
End: 2025-03-31
Payer: MEDICARE

## 2025-03-31 DIAGNOSIS — Z45.02 IMPLANTABLE CARDIOVERTER-DEFIBRILLATOR (ICD) AT END OF BATTERY LIFE: Primary | ICD-10-CM

## 2025-03-31 RX ORDER — SODIUM CHLORIDE 9 MG/ML
40 INJECTION, SOLUTION INTRAVENOUS AS NEEDED
OUTPATIENT
Start: 2025-03-31

## 2025-03-31 RX ORDER — SODIUM CHLORIDE 0.9 % (FLUSH) 0.9 %
10 SYRINGE (ML) INJECTION AS NEEDED
OUTPATIENT
Start: 2025-03-31

## 2025-03-31 RX ORDER — SODIUM CHLORIDE 0.9 % (FLUSH) 0.9 %
10 SYRINGE (ML) INJECTION EVERY 12 HOURS SCHEDULED
OUTPATIENT
Start: 2025-03-31

## 2025-03-31 NOTE — TELEPHONE ENCOUNTER
Ms. Blair has reached [facility] and will need to be scheduled for a Single Chamber ICD battery change.    I have spoken to her and she is aware.

## 2025-04-30 ENCOUNTER — TELEPHONE (OUTPATIENT)
Dept: CARDIOLOGY | Facility: CLINIC | Age: 70
End: 2025-04-30
Payer: MEDICARE

## 2025-05-01 PROBLEM — Z45.02 IMPLANTABLE CARDIOVERTER-DEFIBRILLATOR (ICD) AT END OF BATTERY LIFE: Status: ACTIVE | Noted: 2025-03-31

## 2025-05-01 NOTE — TELEPHONE ENCOUNTER
I spoke to patient and gave an appointment on 05/06/25 for [facility] [employer] single chamber ICD battery change. Patient was instructed to have a  for the day of the procedure and to arrive at the main entrance registration area in the pavilion. Patient was instructed to hold Metformin for 24 hours prior to procedure. Patient was instructed to continue all other medications as usual. Patient was instructed to have no solid food or milk for 6 hours prior to scheduled arrival time, clear liquids only for 6 hours prior up to 2 hours prior to schedule arrival time, NPO for 2 hours prior to scheduled arrival time. Patient was instructed to have labs completed on the morning of the procedure. Patient is agreeable with no other questions or concerns.

## 2025-05-05 NOTE — H&P
"Cardiology Consultation Note  [facility] CATH LAB          Patient Identification:  Geeta Blair      [id number]  69 y.o.        female  1955         PCP: Kristian Tomas MD      History of Present Illness:     Patient is a 69-year-old with a history of hypertension diabetes ICD with nonischemic cardiomyopathy whose device is reached [facility] he has been doing well denies any new complaints or problems    Past History:  Past Medical History:   Diagnosis Date    Anesthesia     \"MAKES ME A LITTLE LOOPY\"    Chronic systolic (congestive) heart failure     Diabetes     Heart disease     High cholesterol     Hypertension     ICD (implantable cardioverter-defibrillator), single, in situ     Nonischemic cardiomyopathy      Past Surgical History:   Procedure Laterality Date    BREAST BIOPSY Right 2006    CARDIAC DEFIBRILLATOR PLACEMENT  2005    COLONOSCOPY  2017    TONSILLECTOMY  1962    WISDOM TOOTH EXTRACTION       Allergies   Allergen Reactions    Penicillins Itching     Social History     Socioeconomic History    Marital status:    Tobacco Use    Smoking status: Never    Smokeless tobacco: Never   Vaping Use    Vaping status: Never Used   Substance and Sexual Activity    Alcohol use: Not Currently    Drug use: Never    Sexual activity: Defer     Family History   Problem Relation Age of Onset    Irregular heart beat Mother     Heart attack Father     Stroke Father     Irregular heart beat Brother     Hypertension Brother     Stroke Maternal Grandmother     Hypertension Brother     Hypertension Brother      Medications:  No current facility-administered medications for this encounter.     Physical exam:    Ht 167.6 cm (66\")   Wt 79.4 kg (175 lb)   BMI 28.25 kg/m²  Body mass index is 28.25 kg/m².   Oxygen saturation   @FLOWAN(10::1)@ No data recorded    General Appearance:   no acute distress  HENT:   lips not cyanotic  Neck:  thyroid not enlarged  supple  Respiratory:  no respiratory distress  normal " "breath sounds  no rales  Cardiovascular:  no jugular venous distention  regular rhythm  apical impulse normal  S1 normal, S2 normal  no S3, no S4   no murmur  no rub, no thrill  no carotid bruit  pedal pulses normal  lower extremity edema: none    Gastrointestinal:   bowel sounds normal  non-tender  no hepatomegaly, no splenomegaly  Musculoskeletal:  no clubbing of fingers.   normocephalic, head atraumatic  Skin:   warm, dry  Neuro/Psychiatric:  judgement and insight appropriate  normal mood and affect    Cardiographics:   Results for orders placed during the hospital encounter of 06/06/24    Adult Transthoracic Echo Complete W/ Cont if Necessary Per Protocol    Interpretation Summary  Diffuse hypokinesis of the left ventricle with mildly reduced left ventricular systolic function ejection fraction of 45%.  Mild MR and mild TR.  ICD lead in the right ventricle.      No results found for this or any previous visit.      Cardiolite (Tc-99m Sestamibi) stress test     Lab Review:           Invalid input(s): \"PLATELETCT\"                            CrCl cannot be calculated (Patient's most recent lab result is older than the maximum 30 days allowed.).         Invalid input(s): \"LDLCALC\"          Lab Results   Component Value Date    TSH 4.160 06/03/2019        Lab Results   Component Value Date    HGBA1C 6.8 (H) 08/28/2019      No results found for: \"DIGOXIN\"   No results found for: \"DDIMERQUAN\"       Assessment:      Implantable cardioverter-defibrillator (ICD) at end of battery life      Initial cardiac assessment: Single-chamber ICD at [facility] device working normally recommended battery change discussed risk benefits alternatives including the risk of bleeding, infection, possible need for lead placement patient was agreeable      Recommendations:  1.  Single-chamber ICD battery change        Thank you for allowing us to share in Geeta BROOKS Rossy  care.        Danny Sanchez MD  5/5/2025  14:50 EDT    "

## 2025-05-05 NOTE — PRE-PROCEDURE INSTRUCTIONS
PATIENT INSTRUCTED TO BE:    - PATIENT INSTRUCTED NOTHING TO EAT/ NO SOLID FOOD OR MILK FOR 6 HOURS PRIOR TO SCHEDULED ARRIVAL TIME.     - MAY HAVE CLEAR LIQUIDS ONLY UP TO 2 HOURS PRIOR TO SCHEDULED ARRIVAL TIME, EXCEPT CAN HAVE SIPS OF WATER WITH MEDICATIONS PRIOR TO PROCEDURE    -  INSTRUCTED NO LOTIONS, JEWELRY, PIERCINGS, OR DEODORANT DAY OF THE PROCEDURE    - INSTRUCTED TO TAKE THE FOLLOWING MEDICATIONS THE DAY OF SURGERY: ASPIRIN, NITROGLYCERIN PATCH, GLIMEPIRIDE, LISINOPRIL, CARVEDILOL, FUROSEMIDE, POTASSIUM    HOLD METFORMIN 24 HOURS PRIOR TO PROCEDURE          - INSTRUCTED PT TO FOLLOW ANY INSTRUCTIONS GIVEN BY HIS CARDIOLOGIST.    - INFORMED PT THEY ATTEMPT RADIAL APPROACH FIRST IF UNABLE TO PERFORM CATH WITH THAT APPROACH THEY WILL DO A FEMORAL APPROACH.  ALSO, INFORMED PT THEY WILL BE ON BEDREST POSTOP.  IF THE SURGEON FEELS  AN INTERVENTION OR STENTS NEEDS TO BE PLACED, HE/SHE WILL STAY OVER NIGHT FOR OBSERVATION AND MONITORING.     - INFORMED THE PATIENT HE CAN HAVE TWO VISITORS WITH HIM THE DAY OF THE PROCEDURE    - INSTRUCTED PT TO COME TO [facility] ( [address]), CAN  PARK OR SELF PARK. ENTER THE PAVILION THRU MAIN ENTRANCE, TAKE ELEVATORS TO THE FIRST FLOOR, CHECK IN AT THE DESK FOR REGISTRATION, AFTER REGISTRATION PT WILL BE TAKEN THE THE PREOP AREA FOR HIS OR HER PROCEDURE.    -ARRIVAL TIME GIVEN BY SAME DAY SURGERY, YOU WILL RECEIVE A PHONE CALL BETWEEN 1PM AND 4PM, INFORMED PT IF ARRIVAL TIME CHANGES OR ADJUSTMENTS NEED TO BE MADE IN THEIR ARRIVAL TIME, HE/SHE WOULD RECEIVE A CALL.    -INSTRUCTED PT TO COME TO [facility] TO GET THEIR LABS/ EKG DONE PRIOR TO PROCEDURE      - PATIENT VERBALIZED UNDERSTANDING

## 2025-05-06 ENCOUNTER — HOSPITAL ENCOUNTER (OUTPATIENT)
Facility: HOSPITAL | Age: 70
Setting detail: HOSPITAL OUTPATIENT SURGERY
Discharge: HOME | End: 2025-05-06
Attending: INTERNAL MEDICINE | Admitting: INTERNAL MEDICINE
Payer: MEDICARE

## 2025-05-06 VITALS
DIASTOLIC BLOOD PRESSURE: 74 MMHG | WEIGHT: 175 LBS | RESPIRATION RATE: 16 BRPM | TEMPERATURE: 98 F | OXYGEN SATURATION: 93 % | HEIGHT: 66 IN | BODY MASS INDEX: 28.12 KG/M2 | HEART RATE: 79 BPM | SYSTOLIC BLOOD PRESSURE: 138 MMHG

## 2025-05-06 DIAGNOSIS — Z45.02 IMPLANTABLE CARDIOVERTER-DEFIBRILLATOR (ICD) AT END OF BATTERY LIFE: ICD-10-CM

## 2025-05-06 LAB
ANION GAP SERPL CALCULATED.3IONS-SCNC: 15 MMOL/L (ref 5–15)
BASOPHILS # BLD AUTO: 0.07 10*3/MM3 (ref 0–0.2)
BASOPHILS NFR BLD AUTO: 0.8 % (ref 0–1.5)
BUN SERPL-MCNC: 18 MG/DL (ref 8–23)
BUN/CREAT SERPL: 21.7 (ref 7–25)
CALCIUM SPEC-SCNC: 9.9 MG/DL (ref 8.6–10.5)
CHLORIDE SERPL-SCNC: 99 MMOL/L (ref 98–107)
CO2 SERPL-SCNC: 24 MMOL/L (ref 22–29)
CREAT SERPL-MCNC: 0.83 MG/DL (ref 0.57–1)
DEPRECATED RDW RBC AUTO: 39.2 FL (ref 37–54)
EGFRCR SERPLBLD CKD-EPI 2021: 75.9 ML/MIN/1.73
EOSINOPHIL # BLD AUTO: 0.46 10*3/MM3 (ref 0–0.4)
EOSINOPHIL NFR BLD AUTO: 5.1 % (ref 0.3–6.2)
ERYTHROCYTE [DISTWIDTH] IN BLOOD BY AUTOMATED COUNT: 12.3 % (ref 12.3–15.4)
GLUCOSE SERPL-MCNC: 196 MG/DL (ref 65–99)
HCT VFR BLD AUTO: 40.4 % (ref 34–46.6)
HGB BLD-MCNC: 13.9 G/DL (ref 12–15.9)
IMM GRANULOCYTES # BLD AUTO: 0.03 10*3/MM3 (ref 0–0.05)
IMM GRANULOCYTES NFR BLD AUTO: 0.3 % (ref 0–0.5)
INR PPP: 1.02 (ref 0.86–1.15)
LYMPHOCYTES # BLD AUTO: 2.23 10*3/MM3 (ref 0.7–3.1)
LYMPHOCYTES NFR BLD AUTO: 24.9 % (ref 19.6–45.3)
MCH RBC QN AUTO: 30.1 PG (ref 26.6–33)
MCHC RBC AUTO-ENTMCNC: 34.4 G/DL (ref 31.5–35.7)
MCV RBC AUTO: 87.4 FL (ref 79–97)
MONOCYTES # BLD AUTO: 0.62 10*3/MM3 (ref 0.1–0.9)
MONOCYTES NFR BLD AUTO: 6.9 % (ref 5–12)
NEUTROPHILS NFR BLD AUTO: 5.55 10*3/MM3 (ref 1.7–7)
NEUTROPHILS NFR BLD AUTO: 62 % (ref 42.7–76)
NRBC BLD AUTO-RTO: 0 /100 WBC (ref 0–0.2)
PLATELET # BLD AUTO: 274 10*3/MM3 (ref 140–450)
PMV BLD AUTO: 9.2 FL (ref 6–12)
POTASSIUM SERPL-SCNC: 4.1 MMOL/L (ref 3.5–5.2)
PROTHROMBIN TIME: 13.8 SECONDS (ref 11.8–14.9)
RBC # BLD AUTO: 4.62 10*6/MM3 (ref 3.77–5.28)
SODIUM SERPL-SCNC: 138 MMOL/L (ref 136–145)
WBC NRBC COR # BLD AUTO: 8.96 10*3/MM3 (ref 3.4–10.8)

## 2025-05-06 PROCEDURE — 80048 BASIC METABOLIC PNL TOTAL CA: CPT | Performed by: NURSE PRACTITIONER

## 2025-05-06 PROCEDURE — 33262 RMVL& REPLC PULSE GEN 1 LEAD: CPT | Performed by: INTERNAL MEDICINE

## 2025-05-06 PROCEDURE — 25010000002 MIDAZOLAM PER 1MG: Performed by: INTERNAL MEDICINE

## 2025-05-06 PROCEDURE — 25010000002 BUPIVACAINE (PF) 0.5 % SOLUTION: Performed by: INTERNAL MEDICINE

## 2025-05-06 PROCEDURE — 85025 COMPLETE CBC W/AUTO DIFF WBC: CPT | Performed by: NURSE PRACTITIONER

## 2025-05-06 PROCEDURE — 25010000002 CEFAZOLIN PER 500 MG: Performed by: INTERNAL MEDICINE

## 2025-05-06 PROCEDURE — 85610 PROTHROMBIN TIME: CPT | Performed by: NURSE PRACTITIONER

## 2025-05-06 PROCEDURE — 25010000002 DIPHENHYDRAMINE PER 50 MG: Performed by: INTERNAL MEDICINE

## 2025-05-06 PROCEDURE — 25010000002 FENTANYL CITRATE (PF) 50 MCG/ML SOLUTION: Performed by: INTERNAL MEDICINE

## 2025-05-06 PROCEDURE — C1722 AICD, SINGLE CHAMBER: HCPCS | Performed by: INTERNAL MEDICINE

## 2025-05-06 PROCEDURE — 25010000002 LIDOCAINE 1% - EPINEPHRINE 1:100000 1 %-1:100000 SOLUTION: Performed by: INTERNAL MEDICINE

## 2025-05-06 DEVICE — IMPLANTABLE CARDIOVERTER DEFIBRILLATOR VR
Type: IMPLANTABLE DEVICE | Site: CHEST | Status: FUNCTIONAL
Brand: MOMENTUM™ EL ICD VR

## 2025-05-06 RX ORDER — CEPHALEXIN 500 MG/1
500 CAPSULE ORAL 3 TIMES DAILY
Qty: 12 CAPSULE | Refills: 0 | Status: SHIPPED | OUTPATIENT
Start: 2025-05-06

## 2025-05-06 RX ORDER — BUPIVACAINE HYDROCHLORIDE 5 MG/ML
INJECTION, SOLUTION EPIDURAL; INTRACAUDAL; PERINEURAL
Status: DISCONTINUED | OUTPATIENT
Start: 2025-05-06 | End: 2025-05-06 | Stop reason: HOSPADM

## 2025-05-06 RX ORDER — FENTANYL CITRATE 50 UG/ML
INJECTION, SOLUTION INTRAMUSCULAR; INTRAVENOUS
Status: DISCONTINUED | OUTPATIENT
Start: 2025-05-06 | End: 2025-05-06 | Stop reason: HOSPADM

## 2025-05-06 RX ORDER — SODIUM CHLORIDE 9 MG/ML
40 INJECTION, SOLUTION INTRAVENOUS AS NEEDED
Status: DISCONTINUED | OUTPATIENT
Start: 2025-05-06 | End: 2025-05-06 | Stop reason: HOSPADM

## 2025-05-06 RX ORDER — DIPHENHYDRAMINE HYDROCHLORIDE 50 MG/ML
INJECTION, SOLUTION INTRAMUSCULAR; INTRAVENOUS
Status: DISCONTINUED | OUTPATIENT
Start: 2025-05-06 | End: 2025-05-06 | Stop reason: HOSPADM

## 2025-05-06 RX ORDER — SODIUM CHLORIDE 0.9 % (FLUSH) 0.9 %
3 SYRINGE (ML) INJECTION EVERY 12 HOURS SCHEDULED
Status: DISCONTINUED | OUTPATIENT
Start: 2025-05-06 | End: 2025-05-06 | Stop reason: HOSPADM

## 2025-05-06 RX ORDER — ONDANSETRON 2 MG/ML
4 INJECTION INTRAMUSCULAR; INTRAVENOUS EVERY 6 HOURS PRN
Status: DISCONTINUED | OUTPATIENT
Start: 2025-05-06 | End: 2025-05-06 | Stop reason: HOSPADM

## 2025-05-06 RX ORDER — SODIUM CHLORIDE 0.9 % (FLUSH) 0.9 %
10 SYRINGE (ML) INJECTION EVERY 12 HOURS SCHEDULED
Status: DISCONTINUED | OUTPATIENT
Start: 2025-05-06 | End: 2025-05-06 | Stop reason: HOSPADM

## 2025-05-06 RX ORDER — SODIUM CHLORIDE 0.9 % (FLUSH) 0.9 %
10 SYRINGE (ML) INJECTION AS NEEDED
Status: DISCONTINUED | OUTPATIENT
Start: 2025-05-06 | End: 2025-05-06 | Stop reason: HOSPADM

## 2025-05-06 RX ORDER — MIDAZOLAM HYDROCHLORIDE 2 MG/2ML
INJECTION, SOLUTION INTRAMUSCULAR; INTRAVENOUS
Status: DISCONTINUED | OUTPATIENT
Start: 2025-05-06 | End: 2025-05-06 | Stop reason: HOSPADM

## 2025-05-06 RX ORDER — LIDOCAINE HYDROCHLORIDE AND EPINEPHRINE 10; 10 MG/ML; UG/ML
INJECTION, SOLUTION INFILTRATION; PERINEURAL
Status: DISCONTINUED | OUTPATIENT
Start: 2025-05-06 | End: 2025-05-06 | Stop reason: HOSPADM

## 2025-05-06 RX ADMIN — SODIUM CHLORIDE 2 G: 9 INJECTION, SOLUTION INTRAVENOUS at 13:17

## 2025-05-06 NOTE — Clinical Note
Allergies reviewed.  H&P note has been confirmed for the patient. Procedural consent has been signed.  Staff has reviewed the patient's labs.
Cautery used
Generator pocket opened.
Instrument tray ID: [id number]
New generator connected. 
No in lab complications
Number of grounding pads used: 1  Location of grounding pads: Right flank
Patient was given Post Procedure instruction by the staff.
Pocket closure in process.
Pocket irrigated with: normal saline.
Prepped: left chest. Prepped with: ChloraPrep. The site was clipped. The patient was draped in a sterile fashion.
Report was  verbally given at bedside. .
Suturing the pocket is complete.
The physician has confirmed that the patient has been reassessed and is appropriate for moderate sedation
Pt is a 17M with no PMH aside from scoliosis s/p spinal fusion, UTD with vaccinations p/w 2 weeks of intermittent diffuse abd pain, nausea without vomiting, diarrhea (about 2 episodes per day of watery non-bloody diarrhea without mucus). Abd pain typically follows meals, as does the nausea. Occurs most days, some days worse than others. Endorses fever yesterday, tmax 101. PT took tylenol this AM at 8AM. LMP about 1 month ago, due for period today or tomorrow. Denies abnormal vaginal discharge or bleeding, denies hx of sexual activity. Denies recent travel. PT went to PMD today who sent her in to ED due to concern for appendicitis.

## 2025-05-06 NOTE — DISCHARGE INSTRUCTIONS
DISCHARGE INSTRUCTIONS  VASCULAR  PROCEDURES      For your surgery you had:  IV sedation.     You may experience dizziness, drowsiness, or light-headedness for several hours following surgery/procedure.  Do not stay alone today or tonight.  Limit your activity for 24 hours.  Resume your diet slowly.  Follow whatever special dietary instructions you may have been given by your doctor.  You should not drive or operate machinery, drink alcohol, or sign legally binding documents for 24 hours or while you are taking pain medication.    NOTIFY YOUR DOCTOR IF YOU EXPERIENCE ANY OF THE FOLLOWING:  Temperature greater than 101 degrees Fahrenheit  Shaking Chills  Redness or excessive drainage from incision  Nausea, vomiting and/or pain that is not controlled by prescribed medications  Increase in bleeding or bleeding that is excessive  Unable to urinate in 6 hours after surgery  If unable to reach your doctor, please go to the closest Emergency Room  May remove dressing: in 24 hours  No heavy lifting greater than 10-15lbs for 3 days.    May shower tomorrow but keep dressing dry.   Medications per physician instructions as indicated on After Visit Summary.    Last dose of pain medication was given at:   .

## 2025-05-09 NOTE — PROGRESS NOTES
"  [facility]  Cardiology progress Note    Patient Name: Geeta Blair  : 1955    CHIEF COMPLAINT  Cardiomyopathy        Subjective   Subjective     HISTORY OF PRESENT ILLNESS    Geeta Blair is a 69 y.o. female with history of cardiomyopathy and hypertension.  No chest pain.    REVIEW OF SYSTEMS    Constitutional:    No fever, no weight loss  Skin:     No rash  Otolaryngeal:    No difficulty swallowing  Cardiovascular: See HPI.  Pulmonary:    No cough, no sputum production    Personal History     Social History:    reports that she has never smoked. She has never used smokeless tobacco. She reports that she does not currently use alcohol. She reports that she does not use drugs.    Home Medications:  Current Outpatient Medications on File Prior to Visit   Medication Sig    aspirin 81 MG EC tablet Take 1 tablet by mouth Daily.    atorvastatin (LIPITOR) 10 MG tablet Take 1 tablet by mouth Every Night.    carvedilol (COREG) 12.5 MG tablet Take 1 tablet by mouth 2 (two) times a day.    cephalexin (KEFLEX) 500 MG capsule Take 1 capsule by mouth 3 (Three) Times a Day.    furosemide (LASIX) 40 MG tablet Take 1 tablet by mouth Daily.    glimepiride (AMARYL) 2 MG tablet Take 1 tablet by mouth 2 (two) times a day.    lisinopril (PRINIVIL,ZESTRIL) 40 MG tablet Take 1 tablet by mouth Daily.    metFORMIN ER (GLUCOPHAGE-XR) 500 MG 24 hr tablet Take 2 tablets by mouth 2 (Two) Times a Day.    multivitamin with minerals tablet tablet Daily.    nitroglycerin (NITRODUR) 0.4 MG/HR patch 0.4 patches Daily.    omega-3 acid ethyl esters (LOVAZA) 1 g capsule Take 2 capsules by mouth 2 (two) times a day.    potassium chloride (K-DUR,KLOR-CON) 20 MEQ CR tablet Take 1 tablet by mouth Daily.     No current facility-administered medications on file prior to visit.       Past Medical History:   Diagnosis Date    Anesthesia     \"MAKES ME A LITTLE LOOPY\"    Chronic systolic (congestive) heart failure     Diabetes     Heart " "disease     High cholesterol     Hypertension     ICD (implantable cardioverter-defibrillator), single, in situ     Nonischemic cardiomyopathy        Allergies:  Allergies   Allergen Reactions    Penicillins Itching       Objective    Objective       Vitals:   Heart Rate:  [68] 68  BP: (121)/(77) 121/77  Body mass index is 27.44 kg/m².     PHYSICAL EXAM:    General Appearance:   well developed  well nourished  HENT:   oropharynx moist  lips not cyanotic  Neck:  thyroid not enlarged  supple  Respiratory:  no respiratory distress  normal breath sounds  no rales  Cardiovascular:  no jugular venous distention  regular rhythm  apical impulse normal  S1 normal, S2 normal  no S3, no S4   no murmur  no rub, no thrill  carotid pulses normal; no bruit  pedal pulses normal  lower extremity edema: none    Skin:   warm, dry  Psychiatric:  judgement and insight appropriate  normal mood and affect        Result Review:  I have personally reviewed the available results from  [x]  Laboratory  [x]  EKG  [x]  Cardiology  [x]  Medications  [x]  Old records  []  Other:     Procedures  No results found for: \"CHOL\"  Lab Results   Component Value Date    TRIG 229 (H) 08/28/2019    TRIG 207 (H) 06/03/2019    TRIG 263 (H) 03/07/2019     Lab Results   Component Value Date    HDL 53 08/28/2019    HDL 44 06/03/2019    HDL 59 03/07/2019     Lab Results   Component Value Date    LDL 64 (L) 08/28/2019    LDL 66 (L) 06/03/2019    LDL 53 (L) 03/07/2019     Lab Results   Component Value Date    VLDL 46 (H) 08/28/2019    VLDL 41 (H) 06/03/2019    VLDL 53 (H) 03/07/2019     Results for orders placed during the hospital encounter of 06/06/24    Adult Transthoracic Echo Complete W/ Cont if Necessary Per Protocol    Interpretation Summary  Diffuse hypokinesis of the left ventricle with mildly reduced left ventricular systolic function ejection fraction of 45%.  Mild MR and mild TR.  ICD lead in the right ventricle.     Impression/Plan:  1.  Nonischemic " cardiomyopathy stable: Continue lisinopril 40 mg a day.  Continue carvedilol 12.5 mg twice a day.  2.  Chronic systolic heart failure stable: Continue Lasix 40 mg once a day.  3.  Presence of ICD status post recent generator change: Generator site okay. no ICD shocks.  4.  Mixed hyperlipidemia: Continue Lipitor 10 mg once a day.  Monitor lipid and hepatic profile.  5.  Essential hypertension controlled: Continue lisinopril 40 MG a day.  Continue carvedilol 12.5 mg twice a day.  Blood pressure well controlled at home              Wil Montano MD   05/15/25   11:24 EDT

## 2025-05-15 ENCOUNTER — OFFICE VISIT (OUTPATIENT)
Dept: CARDIOLOGY | Facility: CLINIC | Age: 70
End: 2025-05-15
Payer: MEDICARE

## 2025-05-15 VITALS
HEART RATE: 68 BPM | WEIGHT: 170 LBS | DIASTOLIC BLOOD PRESSURE: 77 MMHG | HEIGHT: 66 IN | BODY MASS INDEX: 27.32 KG/M2 | SYSTOLIC BLOOD PRESSURE: 121 MMHG

## 2025-05-15 DIAGNOSIS — I10 HYPERTENSION, ESSENTIAL: ICD-10-CM

## 2025-05-15 DIAGNOSIS — E78.2 HYPERLIPEMIA, MIXED: Primary | ICD-10-CM

## 2025-05-15 DIAGNOSIS — Z95.810 PRESENCE OF IMPLANTABLE CARDIOVERTER-DEFIBRILLATOR (ICD): ICD-10-CM

## 2025-05-15 DIAGNOSIS — I42.0 NONISCHEMIC DILATED CARDIOMYOPATHY: ICD-10-CM

## 2025-07-21 LAB
MC_CV_MDC_IDC_RATE_1: 170
MC_CV_MDC_IDC_RATE_1: 200
MC_CV_MDC_IDC_SHOCK_MEASURED_IMPEDANCE: 65
MC_CV_MDC_IDC_THERAPIES: NORMAL
MC_CV_MDC_IDC_THERAPIES: NORMAL
MC_CV_MDC_IDC_ZONE_ID: 1
MC_CV_MDC_IDC_ZONE_ID: 2
MDC_IDC_MSMT_BATTERY_REMAINING_LONGEVITY: 180 MO
MDC_IDC_MSMT_BATTERY_REMAINING_PERCENTAGE: 100 %
MDC_IDC_MSMT_BATTERY_STATUS: NORMAL
MDC_IDC_MSMT_CAP_CHARGE_TIME: 9.7
MDC_IDC_MSMT_LEADCHNL_RV_DTM: NORMAL
MDC_IDC_MSMT_LEADCHNL_RV_IMPEDANCE_VALUE: 513
MDC_IDC_MSMT_LEADCHNL_RV_PACING_THRESHOLD_AMPLITUDE: 1.3
MDC_IDC_MSMT_LEADCHNL_RV_PACING_THRESHOLD_POLARITY: NORMAL
MDC_IDC_MSMT_LEADCHNL_RV_PACING_THRESHOLD_PULSEWIDTH: 0.4
MDC_IDC_MSMT_LEADCHNL_RV_SENSING_INTR_AMPL: 25
MDC_IDC_PG_IMPLANT_DTM: NORMAL
MDC_IDC_PG_MFG: NORMAL
MDC_IDC_PG_MODEL: NORMAL
MDC_IDC_PG_SERIAL: NORMAL
MDC_IDC_PG_TYPE: NORMAL
MDC_IDC_SESS_DTM: NORMAL
MDC_IDC_SESS_TYPE: NORMAL
MDC_IDC_SET_BRADY_LOWRATE: 40
MDC_IDC_SET_BRADY_MODE: NORMAL
MDC_IDC_SET_LEADCHNL_RV_PACING_AMPLITUDE: 2.5
MDC_IDC_SET_LEADCHNL_RV_PACING_POLARITY: NORMAL
MDC_IDC_SET_LEADCHNL_RV_PACING_PULSEWIDTH: 0.4
MDC_IDC_SET_LEADCHNL_RV_SENSING_POLARITY: NORMAL
MDC_IDC_SET_LEADCHNL_RV_SENSING_SENSITIVITY: 0.6
MDC_IDC_SET_ZONE_STATUS: NORMAL
MDC_IDC_SET_ZONE_STATUS: NORMAL
MDC_IDC_SET_ZONE_TYPE: NORMAL
MDC_IDC_SET_ZONE_TYPE: NORMAL
MDC_IDC_STAT_BRADY_RV_PERCENT_PACED: 0
MDC_IDC_STAT_TACHYTHERAPY_ATP_DELIVERED_RECENT: 0
MDC_IDC_STAT_TACHYTHERAPY_SHOCKS_ABORTED_RECENT: 0
MDC_IDC_STAT_TACHYTHERAPY_SHOCKS_DELIVERED_RECENT: 0

## 2025-08-18 LAB
MC_CV_MDC_IDC_RATE_1: 170
MC_CV_MDC_IDC_RATE_1: 200
MC_CV_MDC_IDC_SHOCK_MEASURED_IMPEDANCE: 62
MC_CV_MDC_IDC_THERAPIES: NORMAL
MC_CV_MDC_IDC_THERAPIES: NORMAL
MC_CV_MDC_IDC_ZONE_ID: 1
MC_CV_MDC_IDC_ZONE_ID: 2
MDC_IDC_MSMT_BATTERY_REMAINING_LONGEVITY: 180 MO
MDC_IDC_MSMT_BATTERY_REMAINING_PERCENTAGE: 100 %
MDC_IDC_MSMT_BATTERY_STATUS: NORMAL
MDC_IDC_MSMT_CAP_CHARGE_TIME: 9.7
MDC_IDC_MSMT_LEADCHNL_RV_DTM: NORMAL
MDC_IDC_MSMT_LEADCHNL_RV_IMPEDANCE_VALUE: 524
MDC_IDC_MSMT_LEADCHNL_RV_PACING_THRESHOLD_POLARITY: NORMAL
MDC_IDC_MSMT_LEADCHNL_RV_SENSING_INTR_AMPL: 25
MDC_IDC_PG_IMPLANT_DTM: NORMAL
MDC_IDC_PG_MFG: NORMAL
MDC_IDC_PG_MODEL: NORMAL
MDC_IDC_PG_SERIAL: NORMAL
MDC_IDC_PG_TYPE: NORMAL
MDC_IDC_SESS_DTM: NORMAL
MDC_IDC_SESS_TYPE: NORMAL
MDC_IDC_SET_BRADY_LOWRATE: 40
MDC_IDC_SET_BRADY_MODE: NORMAL
MDC_IDC_SET_LEADCHNL_RV_PACING_AMPLITUDE: 2.5
MDC_IDC_SET_LEADCHNL_RV_PACING_POLARITY: NORMAL
MDC_IDC_SET_LEADCHNL_RV_PACING_PULSEWIDTH: 0.4
MDC_IDC_SET_LEADCHNL_RV_SENSING_POLARITY: NORMAL
MDC_IDC_SET_LEADCHNL_RV_SENSING_SENSITIVITY: 0.6
MDC_IDC_SET_ZONE_STATUS: NORMAL
MDC_IDC_SET_ZONE_STATUS: NORMAL
MDC_IDC_SET_ZONE_TYPE: NORMAL
MDC_IDC_SET_ZONE_TYPE: NORMAL
MDC_IDC_STAT_BRADY_RV_PERCENT_PACED: 0
MDC_IDC_STAT_TACHYTHERAPY_ATP_DELIVERED_RECENT: 0
MDC_IDC_STAT_TACHYTHERAPY_SHOCKS_ABORTED_RECENT: 0
MDC_IDC_STAT_TACHYTHERAPY_SHOCKS_DELIVERED_RECENT: 0

## (undated) DEVICE — PLASMABLADE PS200-040 4.0: Brand: PLASMABLADE™

## (undated) DEVICE — 3M™ STERI-STRIP™ REINFORCED ADHESIVE SKIN CLOSURES, R1546, 1/4 IN X 4 IN (6 MM X 100 MM), 10 STRIPS/ENVELOPE: Brand: 3M™ STERI-STRIP™

## (undated) DEVICE — DRSNG SURG AQUACEL AG/ADVNTGE 9X15CM 3.5X6IN

## (undated) DEVICE — UNDYED BRAIDED (POLYGLACTIN 910), SYNTHETIC ABSORBABLE SUTURE: Brand: COATED VICRYL

## (undated) DEVICE — ANTIBACTERIAL UNDYED BRAIDED (POLYGLACTIN 910), SYNTHETIC ABSORBABLE SUTURE: Brand: COATED VICRYL